# Patient Record
Sex: FEMALE | Race: WHITE | Employment: OTHER | URBAN - METROPOLITAN AREA
[De-identification: names, ages, dates, MRNs, and addresses within clinical notes are randomized per-mention and may not be internally consistent; named-entity substitution may affect disease eponyms.]

---

## 2017-05-30 ENCOUNTER — OFFICE VISIT (OUTPATIENT)
Dept: ORTHOPEDIC SURGERY | Age: 72
End: 2017-05-30

## 2017-05-30 VITALS — BODY MASS INDEX: 24.18 KG/M2 | HEIGHT: 61 IN | WEIGHT: 128.09 LBS

## 2017-05-30 DIAGNOSIS — G56.02 CARPAL TUNNEL SYNDROME OF LEFT WRIST: Primary | ICD-10-CM

## 2017-05-30 PROCEDURE — 1123F ACP DISCUSS/DSCN MKR DOCD: CPT | Performed by: ORTHOPAEDIC SURGERY

## 2017-05-30 PROCEDURE — G8427 DOCREV CUR MEDS BY ELIG CLIN: HCPCS | Performed by: ORTHOPAEDIC SURGERY

## 2017-05-30 PROCEDURE — G8399 PT W/DXA RESULTS DOCUMENT: HCPCS | Performed by: ORTHOPAEDIC SURGERY

## 2017-05-30 PROCEDURE — 1090F PRES/ABSN URINE INCON ASSESS: CPT | Performed by: ORTHOPAEDIC SURGERY

## 2017-05-30 PROCEDURE — 3017F COLORECTAL CA SCREEN DOC REV: CPT | Performed by: ORTHOPAEDIC SURGERY

## 2017-05-30 PROCEDURE — 1036F TOBACCO NON-USER: CPT | Performed by: ORTHOPAEDIC SURGERY

## 2017-05-30 PROCEDURE — 3014F SCREEN MAMMO DOC REV: CPT | Performed by: ORTHOPAEDIC SURGERY

## 2017-05-30 PROCEDURE — 99213 OFFICE O/P EST LOW 20 MIN: CPT | Performed by: ORTHOPAEDIC SURGERY

## 2017-05-30 PROCEDURE — 4040F PNEUMOC VAC/ADMIN/RCVD: CPT | Performed by: ORTHOPAEDIC SURGERY

## 2017-05-30 PROCEDURE — G8420 CALC BMI NORM PARAMETERS: HCPCS | Performed by: ORTHOPAEDIC SURGERY

## 2017-05-31 ENCOUNTER — TELEPHONE (OUTPATIENT)
Dept: ORTHOPEDIC SURGERY | Age: 72
End: 2017-05-31

## 2017-06-08 ENCOUNTER — HOSPITAL ENCOUNTER (OUTPATIENT)
Dept: SURGERY | Age: 72
Discharge: HOME | End: 2017-06-08
Attending: ORTHOPAEDIC SURGERY | Admitting: ORTHOPAEDIC SURGERY

## 2017-06-08 VITALS
BODY MASS INDEX: 24.17 KG/M2 | OXYGEN SATURATION: 99 % | WEIGHT: 128 LBS | TEMPERATURE: 97.4 F | HEART RATE: 63 BPM | RESPIRATION RATE: 14 BRPM | DIASTOLIC BLOOD PRESSURE: 71 MMHG | HEIGHT: 61 IN | SYSTOLIC BLOOD PRESSURE: 163 MMHG

## 2017-06-08 RX ORDER — HYDRALAZINE HYDROCHLORIDE 20 MG/ML
5 INJECTION INTRAMUSCULAR; INTRAVENOUS EVERY 10 MIN PRN
Status: DISCONTINUED | OUTPATIENT
Start: 2017-06-08 | End: 2017-06-09 | Stop reason: HOSPADM

## 2017-06-08 RX ORDER — LIDOCAINE HYDROCHLORIDE 10 MG/ML
0.3 INJECTION, SOLUTION EPIDURAL; INFILTRATION; INTRACAUDAL; PERINEURAL
Status: COMPLETED | OUTPATIENT
Start: 2017-06-08 | End: 2017-06-08

## 2017-06-08 RX ORDER — ONDANSETRON 2 MG/ML
4 INJECTION INTRAMUSCULAR; INTRAVENOUS
Status: ACTIVE | OUTPATIENT
Start: 2017-06-08 | End: 2017-06-08

## 2017-06-08 RX ORDER — LABETALOL HYDROCHLORIDE 5 MG/ML
5 INJECTION, SOLUTION INTRAVENOUS EVERY 10 MIN PRN
Status: DISCONTINUED | OUTPATIENT
Start: 2017-06-08 | End: 2017-06-09 | Stop reason: HOSPADM

## 2017-06-08 RX ORDER — MORPHINE SULFATE 10 MG/ML
2 INJECTION, SOLUTION INTRAMUSCULAR; INTRAVENOUS EVERY 5 MIN PRN
Status: DISCONTINUED | OUTPATIENT
Start: 2017-06-08 | End: 2017-06-09 | Stop reason: HOSPADM

## 2017-06-08 RX ORDER — MORPHINE SULFATE 10 MG/ML
1 INJECTION, SOLUTION INTRAMUSCULAR; INTRAVENOUS EVERY 5 MIN PRN
Status: DISCONTINUED | OUTPATIENT
Start: 2017-06-08 | End: 2017-06-09 | Stop reason: HOSPADM

## 2017-06-08 RX ORDER — HYDROMORPHONE HCL 110MG/55ML
0.5 PATIENT CONTROLLED ANALGESIA SYRINGE INTRAVENOUS EVERY 5 MIN PRN
Status: DISCONTINUED | OUTPATIENT
Start: 2017-06-08 | End: 2017-06-09 | Stop reason: HOSPADM

## 2017-06-08 RX ORDER — OXYCODONE HYDROCHLORIDE AND ACETAMINOPHEN 5; 325 MG/1; MG/1
2 TABLET ORAL PRN
Status: ACTIVE | OUTPATIENT
Start: 2017-06-08 | End: 2017-06-08

## 2017-06-08 RX ORDER — SODIUM CHLORIDE 0.9 % (FLUSH) 0.9 %
10 SYRINGE (ML) INJECTION PRN
Status: DISCONTINUED | OUTPATIENT
Start: 2017-06-08 | End: 2017-06-09 | Stop reason: HOSPADM

## 2017-06-08 RX ORDER — OXYCODONE HYDROCHLORIDE AND ACETAMINOPHEN 5; 325 MG/1; MG/1
1 TABLET ORAL PRN
Status: ACTIVE | OUTPATIENT
Start: 2017-06-08 | End: 2017-06-08

## 2017-06-08 RX ORDER — MEPERIDINE HYDROCHLORIDE 25 MG/ML
12.5 INJECTION INTRAMUSCULAR; INTRAVENOUS; SUBCUTANEOUS EVERY 5 MIN PRN
Status: DISCONTINUED | OUTPATIENT
Start: 2017-06-08 | End: 2017-06-09 | Stop reason: HOSPADM

## 2017-06-08 RX ORDER — HYDROMORPHONE HCL 110MG/55ML
0.25 PATIENT CONTROLLED ANALGESIA SYRINGE INTRAVENOUS EVERY 5 MIN PRN
Status: DISCONTINUED | OUTPATIENT
Start: 2017-06-08 | End: 2017-06-09 | Stop reason: HOSPADM

## 2017-06-08 RX ORDER — HYDROCODONE BITARTRATE AND ACETAMINOPHEN 5; 325 MG/1; MG/1
1 TABLET ORAL EVERY 8 HOURS PRN
Qty: 20 TABLET | Refills: 0 | Status: SHIPPED | OUTPATIENT
Start: 2017-06-08 | End: 2017-06-15

## 2017-06-08 RX ORDER — DIPHENHYDRAMINE HYDROCHLORIDE 50 MG/ML
12.5 INJECTION INTRAMUSCULAR; INTRAVENOUS
Status: ACTIVE | OUTPATIENT
Start: 2017-06-08 | End: 2017-06-08

## 2017-06-08 RX ORDER — PROMETHAZINE HYDROCHLORIDE 25 MG/ML
6.25 INJECTION, SOLUTION INTRAMUSCULAR; INTRAVENOUS
Status: ACTIVE | OUTPATIENT
Start: 2017-06-08 | End: 2017-06-08

## 2017-06-08 RX ORDER — SODIUM CHLORIDE, SODIUM LACTATE, POTASSIUM CHLORIDE, CALCIUM CHLORIDE 600; 310; 30; 20 MG/100ML; MG/100ML; MG/100ML; MG/100ML
INJECTION, SOLUTION INTRAVENOUS CONTINUOUS
Status: DISCONTINUED | OUTPATIENT
Start: 2017-06-08 | End: 2017-06-09 | Stop reason: HOSPADM

## 2017-06-08 RX ORDER — SODIUM CHLORIDE 0.9 % (FLUSH) 0.9 %
10 SYRINGE (ML) INJECTION EVERY 12 HOURS SCHEDULED
Status: DISCONTINUED | OUTPATIENT
Start: 2017-06-08 | End: 2017-06-09 | Stop reason: HOSPADM

## 2017-06-08 RX ORDER — SULFAMETHOXAZOLE AND TRIMETHOPRIM 800; 160 MG/1; MG/1
1 TABLET ORAL 2 TIMES DAILY
Qty: 20 TABLET | Refills: 0 | Status: SHIPPED | OUTPATIENT
Start: 2017-06-08 | End: 2017-06-18

## 2017-06-08 RX ADMIN — SODIUM CHLORIDE, SODIUM LACTATE, POTASSIUM CHLORIDE, CALCIUM CHLORIDE 125 ML/HR: 600; 310; 30; 20 INJECTION, SOLUTION INTRAVENOUS at 10:43

## 2017-06-08 RX ADMIN — LIDOCAINE HYDROCHLORIDE 0.1 ML: 10 INJECTION, SOLUTION EPIDURAL; INFILTRATION; INTRACAUDAL; PERINEURAL at 10:44

## 2017-06-08 ASSESSMENT — PAIN SCALES - GENERAL
PAINLEVEL_OUTOF10: 0

## 2017-06-08 ASSESSMENT — PAIN - FUNCTIONAL ASSESSMENT: PAIN_FUNCTIONAL_ASSESSMENT: 0-10

## 2017-06-08 ASSESSMENT — PAIN DESCRIPTION - DESCRIPTORS: DESCRIPTORS: ACHING;NUMBNESS

## 2017-06-19 ENCOUNTER — OFFICE VISIT (OUTPATIENT)
Dept: ORTHOPEDIC SURGERY | Age: 72
End: 2017-06-19

## 2017-06-19 VITALS — BODY MASS INDEX: 24.18 KG/M2 | HEIGHT: 61 IN | WEIGHT: 128.09 LBS

## 2017-06-19 DIAGNOSIS — G56.02 CARPAL TUNNEL SYNDROME OF LEFT WRIST: Primary | ICD-10-CM

## 2017-06-19 PROCEDURE — 99024 POSTOP FOLLOW-UP VISIT: CPT | Performed by: ORTHOPAEDIC SURGERY

## 2017-06-19 PROCEDURE — L3908 WHO COCK-UP NONMOLDE PRE OTS: HCPCS | Performed by: ORTHOPAEDIC SURGERY

## 2017-10-16 ENCOUNTER — OFFICE VISIT (OUTPATIENT)
Dept: ORTHOPEDIC SURGERY | Age: 72
End: 2017-10-16

## 2017-10-16 VITALS
SYSTOLIC BLOOD PRESSURE: 134 MMHG | HEIGHT: 61 IN | DIASTOLIC BLOOD PRESSURE: 74 MMHG | BODY MASS INDEX: 24.18 KG/M2 | WEIGHT: 128.09 LBS | HEART RATE: 73 BPM

## 2017-10-16 DIAGNOSIS — M17.11 PRIMARY LOCALIZED OSTEOARTHROSIS OF RIGHT LOWER LEG: ICD-10-CM

## 2017-10-16 DIAGNOSIS — M25.561 CHRONIC PAIN OF RIGHT KNEE: Primary | ICD-10-CM

## 2017-10-16 DIAGNOSIS — G89.29 CHRONIC PAIN OF RIGHT KNEE: Primary | ICD-10-CM

## 2017-10-16 PROCEDURE — G8427 DOCREV CUR MEDS BY ELIG CLIN: HCPCS | Performed by: ORTHOPAEDIC SURGERY

## 2017-10-16 PROCEDURE — 3017F COLORECTAL CA SCREEN DOC REV: CPT | Performed by: ORTHOPAEDIC SURGERY

## 2017-10-16 PROCEDURE — G8399 PT W/DXA RESULTS DOCUMENT: HCPCS | Performed by: ORTHOPAEDIC SURGERY

## 2017-10-16 PROCEDURE — 1123F ACP DISCUSS/DSCN MKR DOCD: CPT | Performed by: ORTHOPAEDIC SURGERY

## 2017-10-16 PROCEDURE — 1090F PRES/ABSN URINE INCON ASSESS: CPT | Performed by: ORTHOPAEDIC SURGERY

## 2017-10-16 PROCEDURE — 20610 DRAIN/INJ JOINT/BURSA W/O US: CPT | Performed by: ORTHOPAEDIC SURGERY

## 2017-10-16 PROCEDURE — G8420 CALC BMI NORM PARAMETERS: HCPCS | Performed by: ORTHOPAEDIC SURGERY

## 2017-10-16 PROCEDURE — G8484 FLU IMMUNIZE NO ADMIN: HCPCS | Performed by: ORTHOPAEDIC SURGERY

## 2017-10-16 PROCEDURE — 1036F TOBACCO NON-USER: CPT | Performed by: ORTHOPAEDIC SURGERY

## 2017-10-16 PROCEDURE — 99213 OFFICE O/P EST LOW 20 MIN: CPT | Performed by: ORTHOPAEDIC SURGERY

## 2017-10-16 PROCEDURE — 3014F SCREEN MAMMO DOC REV: CPT | Performed by: ORTHOPAEDIC SURGERY

## 2017-10-16 PROCEDURE — 73562 X-RAY EXAM OF KNEE 3: CPT | Performed by: ORTHOPAEDIC SURGERY

## 2017-10-16 PROCEDURE — 4040F PNEUMOC VAC/ADMIN/RCVD: CPT | Performed by: ORTHOPAEDIC SURGERY

## 2017-10-16 NOTE — PATIENT INSTRUCTIONS
Patient Education        Knee Arthritis: Care Instructions  Your Care Instructions  Knee arthritis is a breakdown of the cartilage that cushions your knee joint. When the cartilage wears down, your bones rub against each other. This causes pain and stiffness. Knee arthritis tends to get worse with time. Treatment for knee arthritis involves reducing pain, making the leg muscles stronger, and staying at a healthy body weight. The treatment usually does not improve the health of the cartilage, but it can reduce pain and improve how well your knee works. You can take simple measures to protect your knee joints, ease your pain, and help you stay active. Follow-up care is a key part of your treatment and safety. Be sure to make and go to all appointments, and call your doctor if you are having problems. It's also a good idea to know your test results and keep a list of the medicines you take. How can you care for yourself at home? · Know that knee arthritis will cause more pain on some days than on others. · Stay at a healthy weight. Lose weight if you are overweight. When you stand up, the pressure on your knees from every pound of body weight is multiplied four times. So if you lose 10 pounds, you will reduce the pressure on your knees by 40 pounds. · Talk to your doctor or physical therapist about exercises that will help ease joint pain. ¨ Stretch to help prevent stiffness and to prevent injury before you exercise. You may enjoy gentle forms of yoga to help keep your knee joints and muscles flexible. ¨ Walk instead of jog. ¨ Ride a bike. This makes your thigh muscles stronger and takes pressure off your knee. ¨ Wear well-fitting and comfortable shoes. ¨ Exercise in chest-deep water. This can help you exercise longer with less pain. ¨ Avoid exercises that include squatting or kneeling. They can put a lot of strain on your knees.   ¨ Talk to your doctor to make sure that the exercise you do is not making seek immediate medical care if:  · You have sudden swelling, warmth, or pain in your knee. · You have knee pain and a fever or rash. · You have such bad pain that you cannot use your knee. Watch closely for changes in your health, and be sure to contact your doctor if you have any problems. Where can you learn more? Go to https://chpepicewmichelet.Neptune.io. org and sign in to your SecondHome account. Enter Q414 in the ReliantHeart box to learn more about \"Knee Arthritis: Care Instructions. \"     If you do not have an account, please click on the \"Sign Up Now\" link. Current as of: October 31, 2016  Content Version: 11.3  © 7960-6908 [employer]. Care instructions adapted under license by [facility] ([facility]). If you have questions about a medical condition or this instruction, always ask your healthcare professional. [place] [address] any warranty or liability for your use of this information. Patient Education        Joint Injections: Care Instructions  Your Care Instructions  Joint injections are shots into a joint, such as the knee. They may be used to put in medicines, such as pain relievers. Or they can be used to take out fluid. Sometimes the fluid is tested in a lab. This can help find the cause of a joint problem. A corticosteroid, or steroid, shot is used to reduce inflammation in tendons or joints. It is often used to treat problems such as arthritis, tendinitis, and bursitis. Steroids can be injected directly into a painful, inflamed joint. They can also help reduce inflammation of a bursa. A bursa is a sac of fluid. It cushions and lubricates areas where tendons, ligaments, skin, muscles, or bones rub against each other. A steroid shot can sometimes help with short-term pain relief when other treatments haven't worked. If steroid shots help, pain may improve for weeks or months. Follow-up care is a key part of your treatment and safety.  Be sure to make and go to all appointments, and call your doctor if you are having problems. It's also a good idea to know your test results and keep a list of the medicines you take. How can you care for yourself at home? · Put ice or a cold pack on the area for 10 to 20 minutes at a time. Put a thin cloth between the ice and your skin. · Take anti-inflammatory medicines to reduce pain, swelling, or inflammation. These include ibuprofen (Advil, Motrin) and naproxen (Aleve). Read and follow all instructions on the label. · Avoid strenuous activities for several days, especially those that put stress on the area where you got the shot. · If you have dressings over the area, keep them clean and dry. You may remove them when your doctor tells you to. When should you call for help? Call your doctor now or seek immediate medical care if:  · You have signs of infection, such as:  ¨ Increased pain, swelling, warmth, or redness. ¨ Red streaks leading from the site. ¨ Pus draining from the site. ¨ A fever. Watch closely for changes in your health, and be sure to contact your doctor if you have any problems. Where can you learn more? Go to https://PAS-Analytik.Pickup Services. org and sign in to your InterValve account. Enter N616 in the An[employer] box to learn more about \"Joint Injections: Care Instructions. \"     If you do not have an account, please click on the \"Sign Up Now\" link. Current as of: March 21, 2017  Content Version: 11.3  © 6530-6781 [employer]om. Care instructions adapted under license by [facility] ([facility]). If you have questions about a medical condition or this instruction, always ask your healthcare professional. [address] any warranty or liability for your use of this information.

## 2017-10-16 NOTE — PROGRESS NOTES
KNEE VISIT      HISTORY OF PRESENT ILLNESS    Stevo Aguero is a 71 y.o. female who presents for right knee pain. Patient's had pain in her knee for many years approximately 1 year ago she began meloxicam and reported dramatic improvement in her discomfort. However over this past year her symptoms have increased and occasionally she reports there at 10/10. This generally happens when she is bending her knee or when she arises from a seated position. Occasionally she'll have a single sharp pain that limits her ability to bend and then this resolves. She will occasionally experience locking of the knee which is associated with a sensation of giving way. She is here for evaluation and treatment recommendations. ROS    10 point review of systems is positive for memory loss, ankle swelling, easy bruising or bruising ability, and joint pain. All other ROS negative except for above. Past Surgical history    Past Surgical History:   Procedure Laterality Date    BACK SURGERY      CARPAL TUNNEL RELEASE      CHOLECYSTECTOMY      COLONOSCOPY  3/25/13    normal       PAST MEDICAL    Past Medical History:   Diagnosis Date    Anxiety     Arthritis     Back pain     Cancer ([facility] [address]     skin    Depression        Allergies    Allergies   Allergen Reactions    Indocin [Indomethacin] Other (See Comments)     This allergy is listed on her history and physical form. Patient unaware of this medication. Meds    Current Outpatient Prescriptions   Medication Sig Dispense Refill    MELOXICAM PO Take by mouth      vitamin B-1 (THIAMINE) 100 MG tablet Take 100 mg by mouth daily      Ascorbic Acid (VITAMIN C) 250 MG tablet Take 250 mg by mouth daily      Multiple Vitamin (MULTI-VITAMIN DAILY PO) Take  by mouth.  fluoxetine (PROZAC) 40 MG capsule Take 40 mg by mouth daily.  aspirin 81 MG chewable tablet Take 81 mg by mouth daily.         tramadol (ULTRAM) 50 MG tablet Take 50 mg by mouth every 6 hours seen at the medial joint line which may or may not be intra-articular and could potentially account for episodes of locking. IMPRESSION    Osteoarthritis of the right knee primarily in the patellofemoral joint with possible loose body formation    PLAN      1. Conservative care options including physical therapy, NSAIDs, bracing, injections and activity modification were discussed. 2.  The indications for therapeutic injections were discussed. 3.  The indications for additional imaging studies were discussed. 4.  After considering the various options discussed, the patient elected to pursue a course that includes viscous supplementation of the right knee to treat her osteoarthritis. The risks, benefits, and complications of the euflexxa injections were  discussed in detail with the patient. The risks discussed included but are not limited to infection, skin reactions, hot swollen joints, and anaphylaxis. The patient gave verbal informed consent for the injection. The patient skin was prepped with iodine and sterile 4x4 gauze pad and the right knee joint was injected with 2 ml of Euflexxa intra-articularly under sterile conditions  into the supra-lateral pouch. The patient tolerated the injection reasonably well. The patient was given instructions to ice the right knee and avoid strenuous activities for 24-48 hours. The patient was instructed to call the office immediately if there is increased pain, redness, warmth, fever, or chills. We will see the patient back in one week for their second injection.

## 2017-10-23 ENCOUNTER — OFFICE VISIT (OUTPATIENT)
Dept: ORTHOPEDIC SURGERY | Age: 72
End: 2017-10-23

## 2017-10-23 VITALS
BODY MASS INDEX: 24.18 KG/M2 | DIASTOLIC BLOOD PRESSURE: 67 MMHG | HEART RATE: 73 BPM | SYSTOLIC BLOOD PRESSURE: 148 MMHG | HEIGHT: 61 IN | WEIGHT: 128.09 LBS

## 2017-10-23 DIAGNOSIS — M17.11 PRIMARY LOCALIZED OSTEOARTHROSIS OF RIGHT LOWER LEG: Primary | ICD-10-CM

## 2017-10-23 PROCEDURE — 20610 DRAIN/INJ JOINT/BURSA W/O US: CPT | Performed by: ORTHOPAEDIC SURGERY

## 2017-10-23 NOTE — PROGRESS NOTES
The patient returns today for their second Euflexxa injection to the right knee for diagnosis of osteoarthritis. . The risks, benefits, and complications of the injections were again discussed in detail with the patient. The risks discussed included but are not limited to infection, skin reactions, hot swollen joints, and anaphylaxis. The patient gave verbal informed consent for the injection. The patient skin was prepped with iodine and sterile 4x4 gauze pad and the right knee joint was injected with 2 ml of Euflexxa intra-articularly under sterile conditions  into the supra-lateral pouch. The patient tolerated the injection reasonably well. The patient was given instructions to ice the right knee and avoid strenuous activities for 24-48 hours. The patient was instructed to call the office immediately if there is increased pain, redness, warmth, fever, or chills. We will see the patient back in [one week] for their third injection.

## 2017-11-06 ENCOUNTER — OFFICE VISIT (OUTPATIENT)
Dept: ORTHOPEDIC SURGERY | Age: 72
End: 2017-11-06

## 2017-11-06 VITALS
HEART RATE: 76 BPM | DIASTOLIC BLOOD PRESSURE: 67 MMHG | HEIGHT: 61 IN | SYSTOLIC BLOOD PRESSURE: 135 MMHG | BODY MASS INDEX: 24.18 KG/M2 | WEIGHT: 128.09 LBS

## 2017-11-06 DIAGNOSIS — M17.11 PRIMARY LOCALIZED OSTEOARTHROSIS OF RIGHT LOWER LEG: Primary | ICD-10-CM

## 2017-11-06 PROCEDURE — 20610 DRAIN/INJ JOINT/BURSA W/O US: CPT | Performed by: ORTHOPAEDIC SURGERY

## 2017-11-06 NOTE — PATIENT INSTRUCTIONS
the arthritis worse. · Do not sit for long periods of time. Try to walk once in a while to keep your knee from getting stiff. · Ask your doctor or physical therapist whether shoe inserts may reduce your arthritis pain. · If you can afford it, get new athletic shoes at least every year. This can help reduce the strain on your knees. · Use a device to help you do everyday activities. ¨ A cane or walking stick can help you keep your balance when you walk. Hold the cane or walking stick in the hand opposite the painful knee. ¨ If you feel like you may fall when you walk, try using crutches or a front-wheeled walker. These can prevent falls that could cause more damage to your knee. ¨ A knee brace may help keep your knee stable and prevent pain. ¨ You also can use other things to make life easier, such as a higher toilet seat and handrails in the bathtub or shower. · Take pain medicines exactly as directed. ¨ Do not wait until you are in severe pain. You will get better results if you take it sooner. ¨ If you are not taking a prescription pain medicine, take an over-the-counter medicine such as acetaminophen (Tylenol), ibuprofen (Advil, Motrin), or naproxen (Aleve). Read and follow all instructions on the label. ¨ Do not take two or more pain medicines at the same time unless the doctor told you to. Many pain medicines have acetaminophen, which is Tylenol. Too much acetaminophen (Tylenol) can be harmful. ¨ Tell your doctor if you take a blood thinner, have diabetes, or have allergies to shellfish. · Ask your doctor if you might benefit from a shot of steroid medicine into your knee. This may provide pain relief for several months. · Many people take the supplements glucosamine and chondroitin for osteoarthritis. Some people feel they help, but the medical research does not show that they work. Talk to your doctor before you take these supplements. When should you call for help?   Call your doctor now or seek immediate medical care if:  · You have sudden swelling, warmth, or pain in your knee. · You have knee pain and a fever or rash. · You have such bad pain that you cannot use your knee. Watch closely for changes in your health, and be sure to contact your doctor if you have any problems. Where can you learn more? Go to https://chpepicewmichelet.Wi3. org and sign in to your [employer] account. Enter J073 in the Medivantix Technologies box to learn more about \"Knee Arthritis: Care Instructions. \"     If you do not have an account, please click on the \"Sign Up Now\" link. Current as of: October 31, 2016  Content Version: 11.3  © 3614-9223 [employer]. Care instructions adapted under license by [facility] ([facility]). If you have questions about a medical condition or this instruction, always ask your healthcare professional. [place] [address] any warranty or liability for your use of this information. Patient Education        Joint Injections: Care Instructions  Your Care Instructions  Joint injections are shots into a joint, such as the knee. They may be used to put in medicines, such as pain relievers. Or they can be used to take out fluid. Sometimes the fluid is tested in a lab. This can help find the cause of a joint problem. A corticosteroid, or steroid, shot is used to reduce inflammation in tendons or joints. It is often used to treat problems such as arthritis, tendinitis, and bursitis. Steroids can be injected directly into a painful, inflamed joint. They can also help reduce inflammation of a bursa. A bursa is a sac of fluid. It cushions and lubricates areas where tendons, ligaments, skin, muscles, or bones rub against each other. A steroid shot can sometimes help with short-term pain relief when other treatments haven't worked. If steroid shots help, pain may improve for weeks or months. Follow-up care is a key part of your treatment and safety.  Be sure to make and go to all appointments, and call your doctor if you are having problems. It's also a good idea to know your test results and keep a list of the medicines you take. How can you care for yourself at home? · Put ice or a cold pack on the area for 10 to 20 minutes at a time. Put a thin cloth between the ice and your skin. · Take anti-inflammatory medicines to reduce pain, swelling, or inflammation. These include ibuprofen (Advil, Motrin) and naproxen (Aleve). Read and follow all instructions on the label. · Avoid strenuous activities for several days, especially those that put stress on the area where you got the shot. · If you have dressings over the area, keep them clean and dry. You may remove them when your doctor tells you to. When should you call for help? Call your doctor now or seek immediate medical care if:  · You have signs of infection, such as:  ¨ Increased pain, swelling, warmth, or redness. ¨ Red streaks leading from the site. ¨ Pus draining from the site. ¨ A fever. Watch closely for changes in your health, and be sure to contact your doctor if you have any problems. Where can you learn more? Go to https://Smart Picture Technologies.Rumble. org and sign in to your COM DEV account. Enter N616 in the "[employer] box to learn more about \"Joint Injections: Care Instructions. \"     If you do not have an account, please click on the \"Sign Up Now\" link. Current as of: March 21, 2017  Content Version: 11.3  © 2207-0722 HBCS, JDCPhosphate. Care instructions adapted under license by [facility] ([facility]). If you have questions about a medical condition or this instruction, always ask your healthcare professional. [address] any warranty or liability for your use of this information.

## 2017-11-06 NOTE — PROGRESS NOTES
The patient returns today for their third Euflexxa injection to the right knee for diagnosis of osteoarthritis. The risks, benefits, and complications of the injections were again discussed in detail with the patient. The risks discussed included but are not limited to infection, skin reactions, hot swollen joints, and anaphylaxis. The patient gave verbal informed consent for the injection. The patient skin was prepped with iodine and sterile 4x4 gauze pad and the right knee joint was injected with 2 ml of Euflexxa intra-articularly under sterile conditions  into the supra-lateral pouch. The patient tolerated the injection reasonably well. The patient was given instructions to ice the right knee and avoid strenuous activities for 24-48 hours. The patient was instructed to call the office immediately if there is increased pain, redness, warmth, fever, or chills. We will see the patient back in 3-months for a follow-up.

## 2017-12-13 ENCOUNTER — HOSPITAL ENCOUNTER (OUTPATIENT)
Dept: MAMMOGRAPHY | Age: 72
Discharge: HOME | End: 2017-12-13
Attending: LICENSED PRACTICAL NURSE | Admitting: LICENSED PRACTICAL NURSE

## 2017-12-13 DIAGNOSIS — N63.20 LEFT BREAST LUMP: ICD-10-CM

## 2017-12-13 DIAGNOSIS — N63.0 BREAST LUMP: ICD-10-CM

## 2018-02-09 ENCOUNTER — OFFICE VISIT (OUTPATIENT)
Dept: ORTHOPEDIC SURGERY | Age: 73
End: 2018-02-09

## 2018-02-09 VITALS
DIASTOLIC BLOOD PRESSURE: 69 MMHG | BODY MASS INDEX: 25.1 KG/M2 | WEIGHT: 132.94 LBS | HEART RATE: 82 BPM | HEIGHT: 61 IN | SYSTOLIC BLOOD PRESSURE: 128 MMHG

## 2018-02-09 DIAGNOSIS — M17.11 PRIMARY LOCALIZED OSTEOARTHROSIS OF RIGHT LOWER LEG: Primary | ICD-10-CM

## 2018-02-09 PROCEDURE — G8399 PT W/DXA RESULTS DOCUMENT: HCPCS | Performed by: ORTHOPAEDIC SURGERY

## 2018-02-09 PROCEDURE — G8419 CALC BMI OUT NRM PARAM NOF/U: HCPCS | Performed by: ORTHOPAEDIC SURGERY

## 2018-02-09 PROCEDURE — 3014F SCREEN MAMMO DOC REV: CPT | Performed by: ORTHOPAEDIC SURGERY

## 2018-02-09 PROCEDURE — G8427 DOCREV CUR MEDS BY ELIG CLIN: HCPCS | Performed by: ORTHOPAEDIC SURGERY

## 2018-02-09 PROCEDURE — 1123F ACP DISCUSS/DSCN MKR DOCD: CPT | Performed by: ORTHOPAEDIC SURGERY

## 2018-02-09 PROCEDURE — 3017F COLORECTAL CA SCREEN DOC REV: CPT | Performed by: ORTHOPAEDIC SURGERY

## 2018-02-09 PROCEDURE — 4040F PNEUMOC VAC/ADMIN/RCVD: CPT | Performed by: ORTHOPAEDIC SURGERY

## 2018-02-09 PROCEDURE — G8484 FLU IMMUNIZE NO ADMIN: HCPCS | Performed by: ORTHOPAEDIC SURGERY

## 2018-02-09 PROCEDURE — 1090F PRES/ABSN URINE INCON ASSESS: CPT | Performed by: ORTHOPAEDIC SURGERY

## 2018-02-09 PROCEDURE — 1036F TOBACCO NON-USER: CPT | Performed by: ORTHOPAEDIC SURGERY

## 2018-02-09 PROCEDURE — 99212 OFFICE O/P EST SF 10 MIN: CPT | Performed by: ORTHOPAEDIC SURGERY

## 2018-02-09 NOTE — PROGRESS NOTES
History  Maulik Gordon is a 71 y.o. female who returns for follow-up of a bilateral knee osteoarthritis. Symptoms are has significantly worsened in the right knee. Pain level today is 8/10. Treatment to date includes oral anti-inflammatories and Visco supplementation. Patient's medications, allergies, past medical, surgical, social and family histories were reviewed and updated as appropriate. Review of Systems  Relevant review of systems reviewed and available in the patient's chart    Primary Area of CC: Patient reports her symptoms in the right knee have increased to a level that she is unable to engage in her normal activities of daily living and would like to proceed with operative intervention. Examination proximal and distal to the injured area show good overall ROM, no bony prominence or soft tissue tenderness, no instability or excessive stiffness. Radiology:       Impression  1. Primary localized osteoarthrosis of right lower leg  MRI Knee Right W JT WO Contrast            Plan  We discussed the risk, benefits, and potential complications of total knee replacement arthroplasty surgery. The patient voiced their understanding to concerns that include infection, deep vein thrombosis, neurological injury, and delayed rehabilitation. The patient also realizes that there are concerns regarding the potential need for manipulation under anesthesia if range of motion proves to be problematic. The patient also understands that there is always a chance of dystrophy and anesthetic complications that would include a stroke, cardiopulmonary pathology, and even death. We also discussed the rehabilitation involved with this operation and options that involved not only the hospitalization but then the potential of either going home with visiting home therapy versus going to a rehabilitation facility.  We talked about the nuances of each of these treatments as well as utilization of a continuous passive motion machine (CPM machine). The patient also realizes the need for the hinged knee brace in the rehabilitation process as well as the very significant role that the patient plays in terms of rehabilitation after this type of operation. All questions were answered.     Doyle Lombard

## 2018-02-13 ENCOUNTER — HOSPITAL ENCOUNTER (OUTPATIENT)
Dept: MRI IMAGING | Age: 73
Discharge: HOME | End: 2018-02-13
Attending: ORTHOPAEDIC SURGERY | Admitting: ORTHOPAEDIC SURGERY

## 2018-02-13 DIAGNOSIS — M17.11 PRIMARY LOCALIZED OSTEOARTHROSIS OF RIGHT LOWER LEG: ICD-10-CM

## 2018-02-13 DIAGNOSIS — M17.11 PRIMARY OSTEOARTHRITIS OF RIGHT KNEE: ICD-10-CM

## 2018-02-21 ASSESSMENT — KOOS JR
BENDING TO THE FLOOR TO PICK UP OBJECT: 1
STANDING UPRIGHT: 1
RISING FROM SITTING: 1
TWISING OR PIVOTING ON KNEE: 3
GOING UP OR DOWN STAIRS: 2
HOW SEVERE IS YOUR KNEE STIFFNESS AFTER FIRST WAKING IN MORNING: 4
STRAIGHTENING KNEE FULLY: 2

## 2018-02-21 ASSESSMENT — PROMIS GLOBAL HEALTH SCALE
TO WHAT EXTENT ARE YOU ABLE TO CARRY OUT YOUR EVERYDAY PHYSICAL ACTIVITIES SUCH AS WALKING, CLIMBING STAIRS, CARRYING GROCERIES, OR MOVING A CHAIR [ON A SCALE OF 1 (NOT AT ALL) TO 5 (COMPLETELY)]?: 3
WHO IS THE PERSON COMPLETING THE PROMIS V1.1 SURVEY?: 0
SUM OF RESPONSES TO QUESTIONS 3, 6, 7, & 8: 16
HOW IS THE PROMIS V1.1 BEING ADMINISTERED?: 0
IN GENERAL, WOULD YOU SAY YOUR QUALITY OF LIFE IS...[ON A SCALE OF 1 (POOR) TO 5 (EXCELLENT)]: 3
IN GENERAL, HOW WOULD YOU RATE YOUR MENTAL HEALTH, INCLUDING YOUR MOOD AND YOUR ABILITY TO THINK [ON A SCALE OF 1 (POOR) TO 5 (EXCELLENT)]?: 2
IN GENERAL, WOULD YOU SAY YOUR HEALTH IS...[ON A SCALE OF 1 (POOR) TO 5 (EXCELLENT)]: 3
IN THE PAST 7 DAYS, HOW WOULD YOU RATE YOUR FATIGUE ON AVERAGE [ON A SCALE FROM 1 (NONE) TO 5 (VERY SEVERE)]?: 2
IN THE PAST 7 DAYS, HOW OFTEN HAVE YOU BEEN BOTHERED BY EMOTIONAL PROBLEMS, SUCH AS FEELING ANXIOUS, DEPRESSED, OR IRRITABLE [ON A SCALE FROM 1 (NEVER) TO 5 (ALWAYS)]?: 3
SUM OF RESPONSES TO QUESTIONS 2, 4, 5, & 10: 13
IN GENERAL, HOW WOULD YOU RATE YOUR PHYSICAL HEALTH [ON A SCALE OF 1 (POOR) TO 5 (EXCELLENT)]?: 3
IN GENERAL, PLEASE RATE HOW WELL YOU CARRY OUT YOUR USUAL SOCIAL ACTIVITIES (INCLUDES ACTIVITIES AT HOME, AT WORK, AND IN YOUR COMMUNITY, AND RESPONSIBILITIES AS A PARENT, CHILD, SPOUSE, EMPLOYEE, FRIEND, ETC) [ON A SCALE OF 1 (POOR) TO 5 (EXCELLENT)]?: 4
IN THE PAST 7 DAYS, HOW WOULD YOU RATE YOUR PAIN ON AVERAGE [ON A SCALE FROM 0 (NO PAIN) TO 10 (WORST IMAGINABLE PAIN)]?: 8
IN GENERAL, HOW WOULD YOU RATE YOUR SATISFACTION WITH YOUR SOCIAL ACTIVITIES AND RELATIONSHIPS [ON A SCALE OF 1 (POOR) TO 5 (EXCELLENT)]?: 5

## 2018-02-28 ENCOUNTER — HOSPITAL ENCOUNTER (OUTPATIENT)
Dept: OTHER | Age: 73
Discharge: HOME | End: 2018-02-28
Attending: ORTHOPAEDIC SURGERY | Admitting: ORTHOPAEDIC SURGERY

## 2018-02-28 ENCOUNTER — TELEPHONE (OUTPATIENT)
Dept: ORTHOPEDIC SURGERY | Age: 73
End: 2018-02-28

## 2018-03-01 ENCOUNTER — TELEPHONE (OUTPATIENT)
Dept: ORTHOPEDIC SURGERY | Age: 73
End: 2018-03-01

## 2018-03-01 LAB
EKG ATRIAL RATE: 63 BPM
EKG DIAGNOSIS: NORMAL
EKG P AXIS: 35 DEGREES
EKG P-R INTERVAL: 186 MS
EKG Q-T INTERVAL: 396 MS
EKG QRS DURATION: 78 MS
EKG QTC CALCULATION (BAZETT): 405 MS
EKG R AXIS: -11 DEGREES
EKG T AXIS: 51 DEGREES
EKG VENTRICULAR RATE: 63 BPM

## 2018-03-01 PROCEDURE — 93010 ELECTROCARDIOGRAM REPORT: CPT | Performed by: INTERNAL MEDICINE

## 2018-03-06 ENCOUNTER — TELEPHONE (OUTPATIENT)
Dept: ORTHOPEDIC SURGERY | Age: 73
End: 2018-03-06

## 2018-03-07 ENCOUNTER — TELEPHONE (OUTPATIENT)
Dept: ORTHOPEDIC SURGERY | Age: 73
End: 2018-03-07

## 2018-03-13 ENCOUNTER — TELEPHONE (OUTPATIENT)
Dept: ORTHOPEDIC SURGERY | Age: 73
End: 2018-03-13

## 2018-03-13 NOTE — TELEPHONE ENCOUNTER
Attempted to contact patient. Left voicemail for patient stating purpose and call back number.    Boo Dutton  Orthopedic Nurse Navigator  Phone number: [phone]

## 2018-03-14 ENCOUNTER — TELEPHONE (OUTPATIENT)
Dept: CASE MANAGEMENT | Age: 73
End: 2018-03-14

## 2018-03-21 PROBLEM — M17.11 PRIMARY OSTEOARTHRITIS OF RIGHT KNEE: Status: ACTIVE | Noted: 2018-03-21

## 2018-03-22 DIAGNOSIS — M17.11 PRIMARY LOCALIZED OSTEOARTHROSIS OF RIGHT LOWER LEG: Primary | ICD-10-CM

## 2018-03-24 ENCOUNTER — CARE COORDINATION (OUTPATIENT)
Dept: CASE MANAGEMENT | Age: 73
End: 2018-03-24

## 2018-03-25 NOTE — CARE COORDINATION
James Plummer  : 1945    Right Total Knee Arthroplasty    CTC attempted first  [facility] 24 hour follow up phone call, left message and Contact information for  patient and encouraged her to return call.      Home Care Agency active: [facility] home care for SN, PT/OT,       Follow up appointments:    Future Appointments  Date Time Provider Ricarda Calloway   2018 10:00 AM Sherie Ely MD [facility]     Leeann Martinez MSN, MA, RN  Care Transition Coordinator  [phone]

## 2018-03-26 NOTE — CARE COORDINATION
[facility] Ortho Bundle Outreach:     Unable to reach patient on mobile or home numbers. Messages left on both stating purpose of call and requesting return call back today. Call to [facility], spoke with Hesham Ruano. States they have also been unable to reach patient by phone but PT was able to do [facility] on Saturday by doing a \"drive by\". Nursing has been unable to start services. They are scheduled for a drive by today. Hesham Ruano has left multiple messages stating patient needs to call their office and allow SN to visit today or PT cannot continue to go out as this is a SN SOC. Hesham Ruano will call writer with update on progress reaching patient today. Review of chart media, her HIPAA form dated 10/16/2017, daughter Adiel Gallardo, is able to receive health information. Call to daughter's mobile number in chart ([phone]). Left message on voice mail stating purpose of call requesting return call to writer and to [facility] with both numbers provided.     Soumya Middleton RN  Care Transition Coordinator  [phone] cell    Follow up appointments:    Future Appointments  Date Time Provider Ricarda Calloway   4/6/2018 10:00 AM Wendi Adame MD [facility]

## 2018-03-28 ENCOUNTER — CARE COORDINATION (OUTPATIENT)
Dept: CASE MANAGEMENT | Age: 73
End: 2018-03-28

## 2018-04-04 ENCOUNTER — CARE COORDINATION (OUTPATIENT)
Dept: CASE MANAGEMENT | Age: 73
End: 2018-04-04

## 2018-04-06 ENCOUNTER — OFFICE VISIT (OUTPATIENT)
Dept: ORTHOPEDIC SURGERY | Age: 73
End: 2018-04-06

## 2018-04-06 VITALS
BODY MASS INDEX: 25.4 KG/M2 | DIASTOLIC BLOOD PRESSURE: 78 MMHG | SYSTOLIC BLOOD PRESSURE: 124 MMHG | HEIGHT: 62 IN | WEIGHT: 138.01 LBS | HEART RATE: 78 BPM

## 2018-04-06 DIAGNOSIS — M17.11 PRIMARY OSTEOARTHRITIS OF RIGHT KNEE: Primary | ICD-10-CM

## 2018-04-06 PROCEDURE — 99024 POSTOP FOLLOW-UP VISIT: CPT | Performed by: ORTHOPAEDIC SURGERY

## 2018-04-11 ENCOUNTER — CARE COORDINATION (OUTPATIENT)
Dept: CASE MANAGEMENT | Age: 73
End: 2018-04-11

## 2018-04-17 ENCOUNTER — HOSPITAL ENCOUNTER (OUTPATIENT)
Dept: PHYSICAL THERAPY | Age: 73
Discharge: HOME | End: 2018-04-30
Attending: ORTHOPAEDIC SURGERY | Admitting: ORTHOPAEDIC SURGERY

## 2018-04-18 ENCOUNTER — CARE COORDINATION (OUTPATIENT)
Dept: CASE MANAGEMENT | Age: 73
End: 2018-04-18

## 2018-04-26 ENCOUNTER — CARE COORDINATION (OUTPATIENT)
Dept: CASE MANAGEMENT | Age: 73
End: 2018-04-26

## 2018-05-01 ENCOUNTER — CARE COORDINATION (OUTPATIENT)
Dept: CASE MANAGEMENT | Age: 73
End: 2018-05-01

## 2018-05-01 ENCOUNTER — HOSPITAL ENCOUNTER (OUTPATIENT)
Dept: OTHER | Age: 73
Discharge: HOME | End: 2018-05-09
Attending: ORTHOPAEDIC SURGERY | Admitting: ORTHOPAEDIC SURGERY

## 2018-05-02 ENCOUNTER — HOSPITAL ENCOUNTER (OUTPATIENT)
Dept: PHYSICAL THERAPY | Age: 73
Discharge: HOME | End: 2018-05-03
Admitting: ORTHOPAEDIC SURGERY

## 2018-05-08 ENCOUNTER — CARE COORDINATION (OUTPATIENT)
Dept: CASE MANAGEMENT | Age: 73
End: 2018-05-08

## 2018-05-09 ENCOUNTER — HOSPITAL ENCOUNTER (OUTPATIENT)
Dept: PHYSICAL THERAPY | Age: 73
Discharge: HOME | End: 2018-05-10
Admitting: ORTHOPAEDIC SURGERY

## 2018-05-15 ENCOUNTER — CARE COORDINATION (OUTPATIENT)
Dept: CASE MANAGEMENT | Age: 73
End: 2018-05-15

## 2018-05-23 ENCOUNTER — CARE COORDINATION (OUTPATIENT)
Dept: CASE MANAGEMENT | Age: 73
End: 2018-05-23

## 2018-06-05 ENCOUNTER — CARE COORDINATION (OUTPATIENT)
Dept: CASE MANAGEMENT | Age: 73
End: 2018-06-05

## 2018-06-11 ENCOUNTER — OFFICE VISIT (OUTPATIENT)
Dept: ORTHOPEDIC SURGERY | Age: 73
End: 2018-06-11

## 2018-06-11 VITALS
BODY MASS INDEX: 25.4 KG/M2 | SYSTOLIC BLOOD PRESSURE: 132 MMHG | WEIGHT: 138 LBS | HEIGHT: 62 IN | HEART RATE: 88 BPM | DIASTOLIC BLOOD PRESSURE: 75 MMHG

## 2018-06-11 DIAGNOSIS — M19.079 ANKLE ARTHRITIS: ICD-10-CM

## 2018-06-11 DIAGNOSIS — M25.572 CHRONIC PAIN OF LEFT ANKLE: Primary | ICD-10-CM

## 2018-06-11 DIAGNOSIS — G89.29 CHRONIC PAIN OF LEFT ANKLE: Primary | ICD-10-CM

## 2018-06-11 PROCEDURE — 1123F ACP DISCUSS/DSCN MKR DOCD: CPT | Performed by: PHYSICIAN ASSISTANT

## 2018-06-11 PROCEDURE — 99213 OFFICE O/P EST LOW 20 MIN: CPT | Performed by: PHYSICIAN ASSISTANT

## 2018-06-11 PROCEDURE — 4040F PNEUMOC VAC/ADMIN/RCVD: CPT | Performed by: PHYSICIAN ASSISTANT

## 2018-06-11 PROCEDURE — G8427 DOCREV CUR MEDS BY ELIG CLIN: HCPCS | Performed by: PHYSICIAN ASSISTANT

## 2018-06-11 PROCEDURE — L1902 AFO ANKLE GAUNTLET PRE OTS: HCPCS | Performed by: PHYSICIAN ASSISTANT

## 2018-06-11 PROCEDURE — 1036F TOBACCO NON-USER: CPT | Performed by: PHYSICIAN ASSISTANT

## 2018-06-11 PROCEDURE — 1090F PRES/ABSN URINE INCON ASSESS: CPT | Performed by: PHYSICIAN ASSISTANT

## 2018-06-11 PROCEDURE — 3017F COLORECTAL CA SCREEN DOC REV: CPT | Performed by: PHYSICIAN ASSISTANT

## 2018-06-11 PROCEDURE — G8419 CALC BMI OUT NRM PARAM NOF/U: HCPCS | Performed by: PHYSICIAN ASSISTANT

## 2018-06-11 PROCEDURE — G8399 PT W/DXA RESULTS DOCUMENT: HCPCS | Performed by: PHYSICIAN ASSISTANT

## 2018-06-13 ENCOUNTER — OFFICE VISIT (OUTPATIENT)
Dept: ORTHOPEDIC SURGERY | Age: 73
End: 2018-06-13

## 2018-06-13 VITALS
DIASTOLIC BLOOD PRESSURE: 70 MMHG | HEART RATE: 76 BPM | SYSTOLIC BLOOD PRESSURE: 135 MMHG | WEIGHT: 138.01 LBS | BODY MASS INDEX: 25.4 KG/M2 | HEIGHT: 62 IN

## 2018-06-13 DIAGNOSIS — M25.562 ACUTE PAIN OF LEFT KNEE: Primary | ICD-10-CM

## 2018-06-13 DIAGNOSIS — M17.12 PRIMARY LOCALIZED OSTEOARTHRITIS OF LEFT KNEE: ICD-10-CM

## 2018-06-13 PROCEDURE — 1090F PRES/ABSN URINE INCON ASSESS: CPT | Performed by: ORTHOPAEDIC SURGERY

## 2018-06-13 PROCEDURE — 3017F COLORECTAL CA SCREEN DOC REV: CPT | Performed by: ORTHOPAEDIC SURGERY

## 2018-06-13 PROCEDURE — G8419 CALC BMI OUT NRM PARAM NOF/U: HCPCS | Performed by: ORTHOPAEDIC SURGERY

## 2018-06-13 PROCEDURE — 4040F PNEUMOC VAC/ADMIN/RCVD: CPT | Performed by: ORTHOPAEDIC SURGERY

## 2018-06-13 PROCEDURE — G8399 PT W/DXA RESULTS DOCUMENT: HCPCS | Performed by: ORTHOPAEDIC SURGERY

## 2018-06-13 PROCEDURE — 99213 OFFICE O/P EST LOW 20 MIN: CPT | Performed by: ORTHOPAEDIC SURGERY

## 2018-06-13 PROCEDURE — 1123F ACP DISCUSS/DSCN MKR DOCD: CPT | Performed by: ORTHOPAEDIC SURGERY

## 2018-06-13 PROCEDURE — G8427 DOCREV CUR MEDS BY ELIG CLIN: HCPCS | Performed by: ORTHOPAEDIC SURGERY

## 2018-06-13 PROCEDURE — 1036F TOBACCO NON-USER: CPT | Performed by: ORTHOPAEDIC SURGERY

## 2018-06-19 ENCOUNTER — OFFICE VISIT (OUTPATIENT)
Dept: ORTHOPEDIC SURGERY | Age: 73
End: 2018-06-19

## 2018-06-19 VITALS
HEIGHT: 62 IN | BODY MASS INDEX: 25.4 KG/M2 | HEART RATE: 82 BPM | SYSTOLIC BLOOD PRESSURE: 153 MMHG | WEIGHT: 138.01 LBS | DIASTOLIC BLOOD PRESSURE: 76 MMHG

## 2018-06-19 DIAGNOSIS — M19.072 OSTEOARTHRITIS OF LEFT SUBTALAR JOINT: Primary | ICD-10-CM

## 2018-06-19 PROCEDURE — G8419 CALC BMI OUT NRM PARAM NOF/U: HCPCS | Performed by: ORTHOPAEDIC SURGERY

## 2018-06-19 PROCEDURE — 3017F COLORECTAL CA SCREEN DOC REV: CPT | Performed by: ORTHOPAEDIC SURGERY

## 2018-06-19 PROCEDURE — 4040F PNEUMOC VAC/ADMIN/RCVD: CPT | Performed by: ORTHOPAEDIC SURGERY

## 2018-06-19 PROCEDURE — 99214 OFFICE O/P EST MOD 30 MIN: CPT | Performed by: ORTHOPAEDIC SURGERY

## 2018-06-19 PROCEDURE — 1123F ACP DISCUSS/DSCN MKR DOCD: CPT | Performed by: ORTHOPAEDIC SURGERY

## 2018-06-19 PROCEDURE — 1036F TOBACCO NON-USER: CPT | Performed by: ORTHOPAEDIC SURGERY

## 2018-06-19 PROCEDURE — 1090F PRES/ABSN URINE INCON ASSESS: CPT | Performed by: ORTHOPAEDIC SURGERY

## 2018-06-19 PROCEDURE — G8399 PT W/DXA RESULTS DOCUMENT: HCPCS | Performed by: ORTHOPAEDIC SURGERY

## 2018-06-19 PROCEDURE — G8427 DOCREV CUR MEDS BY ELIG CLIN: HCPCS | Performed by: ORTHOPAEDIC SURGERY

## 2018-06-20 ENCOUNTER — OFFICE VISIT (OUTPATIENT)
Dept: ORTHOPEDIC SURGERY | Age: 73
End: 2018-06-20

## 2018-06-20 ENCOUNTER — CARE COORDINATION (OUTPATIENT)
Dept: CASE MANAGEMENT | Age: 73
End: 2018-06-20

## 2018-06-20 VITALS
HEIGHT: 62 IN | SYSTOLIC BLOOD PRESSURE: 132 MMHG | HEART RATE: 77 BPM | BODY MASS INDEX: 25.4 KG/M2 | WEIGHT: 138 LBS | DIASTOLIC BLOOD PRESSURE: 74 MMHG

## 2018-06-20 DIAGNOSIS — M17.12 PRIMARY LOCALIZED OSTEOARTHRITIS OF LEFT KNEE: Primary | ICD-10-CM

## 2018-06-20 PROCEDURE — 3017F COLORECTAL CA SCREEN DOC REV: CPT | Performed by: PHYSICIAN ASSISTANT

## 2018-06-20 PROCEDURE — 99214 OFFICE O/P EST MOD 30 MIN: CPT | Performed by: PHYSICIAN ASSISTANT

## 2018-06-20 PROCEDURE — G8419 CALC BMI OUT NRM PARAM NOF/U: HCPCS | Performed by: PHYSICIAN ASSISTANT

## 2018-06-20 PROCEDURE — 1123F ACP DISCUSS/DSCN MKR DOCD: CPT | Performed by: PHYSICIAN ASSISTANT

## 2018-06-20 PROCEDURE — G8428 CUR MEDS NOT DOCUMENT: HCPCS | Performed by: PHYSICIAN ASSISTANT

## 2018-06-20 PROCEDURE — 1090F PRES/ABSN URINE INCON ASSESS: CPT | Performed by: PHYSICIAN ASSISTANT

## 2018-06-20 PROCEDURE — G8399 PT W/DXA RESULTS DOCUMENT: HCPCS | Performed by: PHYSICIAN ASSISTANT

## 2018-06-20 PROCEDURE — 1036F TOBACCO NON-USER: CPT | Performed by: PHYSICIAN ASSISTANT

## 2018-06-20 PROCEDURE — 4040F PNEUMOC VAC/ADMIN/RCVD: CPT | Performed by: PHYSICIAN ASSISTANT

## 2018-06-27 DIAGNOSIS — M25.562 LEFT KNEE PAIN, UNSPECIFIED CHRONICITY: Primary | ICD-10-CM

## 2018-07-19 ENCOUNTER — OFFICE VISIT (OUTPATIENT)
Dept: ORTHOPEDIC SURGERY | Age: 73
End: 2018-07-19

## 2018-07-19 VITALS — BODY MASS INDEX: 25.4 KG/M2 | WEIGHT: 138 LBS | HEIGHT: 62 IN

## 2018-07-19 DIAGNOSIS — M17.12 PRIMARY OSTEOARTHRITIS OF LEFT KNEE: Primary | ICD-10-CM

## 2018-07-19 PROCEDURE — 99213 OFFICE O/P EST LOW 20 MIN: CPT | Performed by: ORTHOPAEDIC SURGERY

## 2018-07-19 PROCEDURE — G8427 DOCREV CUR MEDS BY ELIG CLIN: HCPCS | Performed by: ORTHOPAEDIC SURGERY

## 2018-07-19 PROCEDURE — 1101F PT FALLS ASSESS-DOCD LE1/YR: CPT | Performed by: ORTHOPAEDIC SURGERY

## 2018-07-19 PROCEDURE — G8419 CALC BMI OUT NRM PARAM NOF/U: HCPCS | Performed by: ORTHOPAEDIC SURGERY

## 2018-07-19 PROCEDURE — 3017F COLORECTAL CA SCREEN DOC REV: CPT | Performed by: ORTHOPAEDIC SURGERY

## 2018-07-19 PROCEDURE — 4040F PNEUMOC VAC/ADMIN/RCVD: CPT | Performed by: ORTHOPAEDIC SURGERY

## 2018-07-19 PROCEDURE — 1123F ACP DISCUSS/DSCN MKR DOCD: CPT | Performed by: ORTHOPAEDIC SURGERY

## 2018-07-19 PROCEDURE — 1036F TOBACCO NON-USER: CPT | Performed by: ORTHOPAEDIC SURGERY

## 2018-07-19 PROCEDURE — 1090F PRES/ABSN URINE INCON ASSESS: CPT | Performed by: ORTHOPAEDIC SURGERY

## 2018-07-19 PROCEDURE — G8399 PT W/DXA RESULTS DOCUMENT: HCPCS | Performed by: ORTHOPAEDIC SURGERY

## 2018-07-19 NOTE — PROGRESS NOTES
(CHOLECALCIFEROL) 1000 UNIT TABS tablet Take 1,000 Units by mouth daily      mupirocin (BACTROBAN) 2 % ointment       rivaroxaban (XARELTO) 10 MG TABS tablet Take 1 tablet by mouth daily (with breakfast) for 10 days 10 tablet 0    MELOXICAM PO Take 15 mg by mouth daily       vitamin B-1 (THIAMINE) 100 MG tablet Take 100 mg by mouth daily      Ascorbic Acid (VITAMIN C) 250 MG tablet Take 250 mg by mouth daily      Multiple Vitamin (MULTI-VITAMIN DAILY PO) Take  by mouth.  fluoxetine (PROZAC) 40 MG capsule Take 40 mg by mouth daily. No current facility-administered medications for this visit. Medication Management  Patient has been treated with NSAIDs, Steroids and Analgesics with Minimal relief for 3 years. Review of Systems:  Relevant review of systems reviewed and available in the patient's chart    Vital Signs: There were no vitals filed for this visit. Body mass index is 25.24 kg/m². Limitation in Activities of Daily Living (ADLs)  The patient is able to ambulate without the assistance of cane for 2 steps  steps/feet. Walking distance less than 1 block    Patient needs assistance with activities of daily living bathing and cooking. [facility] mental health facility: No     Safety Issues: Difficulty with daily activities and Risk of falls  Patient is at risk for falls/fall history: Yes    General Exam:   Constitutional: Patient is adequately groomed with no evidence of malnutrition  DTRs: Deep tendon reflexes are intact  Mental Status: The patient is oriented to time, place and person. The patient's mood and affect are appropriate. Lymphatic: The lymphatic examination bilaterally reveals all areas to be without enlargement or induration. Vascular: Examination reveals no swelling or calf tenderness. Peripheral pulses are palpable and 2+. Neurological: The patient has good coordination. There is no weakness or sensory deficit.     Left Knee past 3 years. Impression:  Encounter Diagnosis   Name Primary?  Primary osteoarthritis of left knee Yes       Office Procedures:  No orders of the defined types were placed in this encounter. Treatment Plan:  I discussed with the patient the nature of osteoarthritis of the knee. We talked about treatment of arthritis and the various options that are involved with this. The patient understands that the treatments can vary from essentially doing nothing to a total joint replacement arthroplasty for arthritis. I then went on to describe the utilization of glucosamine and chondroitin sulfate as a joint nutrition product. We talked about the fact that this is essentially a joint vitamin with typically minimal side effects. We also talked about utilization of prescription over-the-counter anti-inflammatory medications as the next option. We also discussed the possibility of brace wear or orthotic wear if the patient has significant varus alignment. We then went on to discuss the possibility of Visco supplementation with hyaluronate products. We talked about the typical course of this type of treatment and the fact that often times in the treatment for significant arthritis, this is successful less than half the time. We also talked about the corticosteroid injections and the fact that this can give a brief window of relief, but does not cure the problem; in fact, the pain often has a rebound effect in 6-10 weeks after the steroid has worn off. We also discussed arthroscopy surgery in attempts to debride the joint, but the fact that this is relatively unreliable treatment in the face of significant arthritis. It can occasionally be used, particularly if there is significant meniscus pathology. Lastly we discussed total joint replacement arthroplasty as the final and definitive step in treatment of arthritis.  Patient realizes the magnitude of this type of treatment as well as having voiced a general understanding

## 2018-07-25 ENCOUNTER — HOSPITAL ENCOUNTER (OUTPATIENT)
Dept: PHYSICAL THERAPY | Age: 73
Setting detail: THERAPIES SERIES
Discharge: HOME | End: 2018-07-25
Payer: MEDICARE

## 2018-07-25 ENCOUNTER — TELEPHONE (OUTPATIENT)
Dept: ORTHOPEDIC SURGERY | Age: 73
End: 2018-07-25

## 2018-07-25 NOTE — TELEPHONE ENCOUNTER
time of surgery (if went through [facility]): Additional Medical Concerns:     Mental Health History- takes a prozac daily. Additional Recommendations for above concerns:      Discharge Disposition Information:     Attended Pre-Hab Program: yes     Anticipated Discharge Disposition:  Home per OV. Has not scheduled out pt PT     Who will be with patient at home following discharge?         Equipment pt already has:  Eulogio Darden on first or second floor: First   Bathroom on first or second floor: First   Weight bearing status: Full   Pre-op ambulatory status: NONE   Number of entry steps: 3 steps   Caregiver assistance: Full time        Heather Goss  7/25/2018

## 2018-08-06 ENCOUNTER — HOSPITAL ENCOUNTER (OUTPATIENT)
Age: 73
End: 2018-08-06

## 2018-08-06 ENCOUNTER — HOSPITAL ENCOUNTER (OUTPATIENT)
Age: 73
Discharge: HOME | End: 2018-08-06
Payer: MEDICARE

## 2018-08-06 LAB
AMORPHOUS: ABNORMAL /HPF
APTT: 29.9 SEC (ref 26–36)
BACTERIA: ABNORMAL /HPF
BILIRUBIN URINE: NEGATIVE
BLOOD, URINE: ABNORMAL
CLARITY: CLEAR
COLOR: YELLOW
EPITHELIAL CELLS, UA: ABNORMAL /HPF
GLUCOSE URINE: NEGATIVE MG/DL
INR BLD: 1.04 (ref 0.86–1.14)
KETONES, URINE: NEGATIVE MG/DL
LEUKOCYTE ESTERASE, URINE: NEGATIVE
MICROSCOPIC EXAMINATION: YES
MUCUS: ABNORMAL /LPF
NITRITE, URINE: NEGATIVE
PH UA: 6
PROTEIN UA: NEGATIVE MG/DL
PROTHROMBIN TIME: 11.8 SEC (ref 9.8–13)
RBC UA: ABNORMAL /HPF (ref 0–2)
SPECIFIC GRAVITY UA: >=1.03
URINE TYPE: ABNORMAL
UROBILINOGEN, URINE: 0.2 E.U./DL
WBC UA: ABNORMAL /HPF (ref 0–5)

## 2018-08-06 PROCEDURE — 80048 BASIC METABOLIC PNL TOTAL CA: CPT

## 2018-08-06 PROCEDURE — 81001 URINALYSIS AUTO W/SCOPE: CPT

## 2018-08-06 PROCEDURE — 84466 ASSAY OF TRANSFERRIN: CPT

## 2018-08-06 PROCEDURE — 85610 PROTHROMBIN TIME: CPT

## 2018-08-06 PROCEDURE — 36415 COLL VENOUS BLD VENIPUNCTURE: CPT

## 2018-08-06 PROCEDURE — 87086 URINE CULTURE/COLONY COUNT: CPT

## 2018-08-06 PROCEDURE — 85025 COMPLETE CBC W/AUTO DIFF WBC: CPT

## 2018-08-06 PROCEDURE — 82040 ASSAY OF SERUM ALBUMIN: CPT

## 2018-08-06 PROCEDURE — 83036 HEMOGLOBIN GLYCOSYLATED A1C: CPT

## 2018-08-06 PROCEDURE — 85730 THROMBOPLASTIN TIME PARTIAL: CPT

## 2018-08-07 LAB
ALBUMIN SERPL-MCNC: 4.5 G/DL (ref 3.4–5)
ANION GAP SERPL CALCULATED.3IONS-SCNC: 12 MMOL/L (ref 3–16)
BASOPHILS ABSOLUTE: 0 K/UL (ref 0–0.2)
BASOPHILS RELATIVE PERCENT: 0.5 %
BUN BLDV-MCNC: 23 MG/DL (ref 7–20)
CALCIUM SERPL-MCNC: 10.2 MG/DL (ref 8.3–10.6)
CHLORIDE BLD-SCNC: 105 MMOL/L (ref 99–110)
CO2: 27 MMOL/L (ref 21–32)
CREAT SERPL-MCNC: 0.7 MG/DL (ref 0.6–1.2)
EOSINOPHILS ABSOLUTE: 0.1 K/UL (ref 0–0.6)
EOSINOPHILS RELATIVE PERCENT: 2 %
ESTIMATED AVERAGE GLUCOSE: 88.2 MG/DL
GFR AFRICAN AMERICAN: >60
GFR NON-AFRICAN AMERICAN: >60
GLUCOSE BLD-MCNC: 96 MG/DL (ref 70–99)
HBA1C MFR BLD: 4.7 %
HCT VFR BLD CALC: 40 % (ref 36–48)
HEMOGLOBIN: 13.3 G/DL (ref 12–16)
LYMPHOCYTES ABSOLUTE: 1.2 K/UL (ref 1–5.1)
LYMPHOCYTES RELATIVE PERCENT: 19.8 %
MCH RBC QN AUTO: 31.2 PG (ref 26–34)
MCHC RBC AUTO-ENTMCNC: 33.3 G/DL (ref 31–36)
MCV RBC AUTO: 93.8 FL (ref 80–100)
MONOCYTES ABSOLUTE: 0.4 K/UL (ref 0–1.3)
MONOCYTES RELATIVE PERCENT: 6.9 %
NEUTROPHILS ABSOLUTE: 4.2 K/UL (ref 1.7–7.7)
NEUTROPHILS RELATIVE PERCENT: 70.8 %
PDW BLD-RTO: 15 % (ref 12.4–15.4)
PLATELET # BLD: 211 K/UL (ref 135–450)
PMV BLD AUTO: 10 FL (ref 5–10.5)
POTASSIUM SERPL-SCNC: 3.9 MMOL/L (ref 3.5–5.1)
RBC # BLD: 4.26 M/UL (ref 4–5.2)
SODIUM BLD-SCNC: 144 MMOL/L (ref 136–145)
TRANSFERRIN: 311 MG/DL (ref 200–360)
URINE CULTURE, ROUTINE: NORMAL
WBC # BLD: 6 K/UL (ref 4–11)

## 2018-08-13 ENCOUNTER — HOSPITAL ENCOUNTER (OUTPATIENT)
Dept: CT IMAGING | Age: 73
Discharge: HOME | End: 2018-08-13
Payer: MEDICARE

## 2018-08-13 DIAGNOSIS — M25.562 LEFT KNEE PAIN, UNSPECIFIED CHRONICITY: ICD-10-CM

## 2018-08-13 PROCEDURE — 73700 CT LOWER EXTREMITY W/O DYE: CPT

## 2018-08-14 ENCOUNTER — HOSPITAL ENCOUNTER (OUTPATIENT)
Dept: PHYSICAL THERAPY | Age: 73
Setting detail: THERAPIES SERIES
Discharge: HOME | End: 2018-08-14
Payer: MEDICARE

## 2018-08-14 PROCEDURE — G8978 MOBILITY CURRENT STATUS: HCPCS

## 2018-08-14 PROCEDURE — 97161 PT EVAL LOW COMPLEX 20 MIN: CPT

## 2018-08-14 PROCEDURE — G8979 MOBILITY GOAL STATUS: HCPCS

## 2018-08-14 PROCEDURE — 97110 THERAPEUTIC EXERCISES: CPT

## 2018-08-14 NOTE — PROGRESS NOTES
Obstructive Sleep Apnea (TESSY) Screening     Patient:  Mortimer Brome    YOB: 1945      Medical Record #:  [id number]                     Date:  8/14/2018     1. Are you a loud and/or regular snorer? []  Yes       [x] No    2. Have you been observed to gasp or stop breathing during sleep? []  Yes       [x] No    3. Do you feel tired or groggy upon awakening or do you awaken with a headache?           []  Yes       [x] No    4. Are you often tired or fatigued during the wake time hours? []  Yes       [x] No    5. Do you fall asleep sitting, reading, watching TV or driving? []  Yes       [x] No    6. Do you often have problems with memory or concentration? []  Yes       [x] No    **If patient's score is ? 3 they are considered high risk for TESSY. Notify the anesthesiologist of the high risk and document in focus note. Note:  If the patient's BMI is more than 35 kg m¯² , has neck circumference > 40 cm, and/or high blood pressure the risk is greater (© American Sleep Apnea Association, 2006).

## 2018-08-14 NOTE — PLAN OF CARE
[facility], [address]  Phone: [phone]  Fax [phone]     Physical Therapy Certification    Dear Referring Practitioner: Dr Gypsy Motta,    We had the pleasure of evaluating the following patient for physical therapy services at [address] [place] [address]. A summary of our findings can be found in the initial assessment below. This includes our plan of care. If you have any questions or concerns regarding these findings, please do not hesitate to contact me at the office phone number checked above. Thank you for the referral.       Physician Signature:_______________________________Date:__________________  By signing above (or electronic signature), therapists plan is approved by physician    Patient: Daphne Galo   : 1945   MRN: [id number]  Referring Physician: Referring Practitioner: Dr Gypsy Motta      Evaluation Date: 2018      Medical Diagnosis Information:  Diagnosis: M17.12 (ICD-10-CM) - Primary localized osteoarthritis of left knee   Treatment Diagnosis: L Knee OA M17.12, L knee pain M25.562                                         Insurance information: PT Insurance Information: [facility]     Precautions/ Contra-indications: R TKR 3/21/18, L STJ OA (wears ankle brace)  Latex Allergy:  [x]NO      []YES  Preferred Language for Healthcare:   [x]English       []other:    SUBJECTIVE: Patient stated complaint of L knee pain that has been worsening over time. She has difficulty walking, standing, sleeping and climbing stairs. Relevant Medical History R TKR 3/21/18, Lumbar Fusion     Functional Disability Index:PT G-Codes  Functional Assessment Tool Used: LEFS  Score: 45%  Functional Limitation: Mobility: Walking and moving around  Mobility: Walking and Moving Around Current Status ():  At least 40 percent but less than 60 percent impaired, limited or restricted  Mobility: Walking evaluation/treatment:    []Excellent   [x]Good    []Fair   []Poor  Physical Therapy Evaluation Complexity Justification  [x] A history of present problem with:  [] no personal factors and/or comorbidities that impact the plan of care;  []1-2 personal factors and/or comorbidities that impact the plan of care  [x]3 personal factors and/or comorbidities that impact the plan of care  [x] An examination of body systems using standardized tests and measures addressing any of the following: body structures and functions (impairments), activity limitations, and/or participation restrictions;:  [x] a total of 1-2 or more elements   [] a total of 3 or more elements   [] a total of 4 or more elements   [x] A clinical presentation with:  [x] stable and/or uncomplicated characteristics   [] evolving clinical presentation with changing characteristics  [] unstable and unpredictable characteristics;   [x] Clinical decision making of [x] low, [] moderate, [] high complexity using standardized patient assessment instrument and/or measurable assessment of functional outcome. [x] EVAL (LOW) 83855 (typically 20 minutes face-to-face)  [] EVAL (MOD) 94379 (typically 30 minutes face-to-face)  [] EVAL (HIGH) 23172 (typically 45 minutes face-to-face)  [] RE-EVAL       PLAN:   Frequency/Duration:  PT EVAL (Pre-hab) with HEP  Interventions:  [x]  Therapeutic exercise including: strength training, ROM, for Lower extremity and core   [x]  NMR activation and proprioception for LE, Glutes and Core   []  Manual therapy as indicated for LE, Hip and spine to include: Dry Needling/IASTM, STM, PROM, Gr I-IV mobilizations, manipulation. [] Modalities as needed that may include: thermal agents, E-stim, Biofeedback, US, iontophoresis as indicated  [] Patient education on joint protection, postural re-education, activity modification, progression of HEP.     HEP instruction: (see scanned forms)    GOALS:  Patient stated goal:Decrease pain    Therapist

## 2018-08-14 NOTE — FLOWSHEET NOTE
[facility],  [address]  Phone: [phone]  Fax [phone]    Physical Therapy Daily Treatment Note  Date:  2018    Patient Name:  Cinda Lesch    :  1945  MRN: [id number]  Restrictions/Precautions:    Medical/Treatment Diagnosis Information:  Diagnosis: M17.12 (ICD-10-CM) - Primary localized osteoarthritis of left knee  Treatment Diagnosis: L Knee OA M17.12, L knee pain R45.892  Insurance/Certification information:  PT Insurance Information: [facility]  Physician Information:  Referring Practitioner: Dr Tavo Hammer of care signed (Y/N):     Date of Patient follow up with Physician:     G-Code (if applicable):      Date G-Code Applied:    PT G-Codes  Functional Assessment Tool Used: LEFS  Score: 45%  Functional Limitation: Mobility: Walking and moving around  Mobility: Walking and Moving Around Current Status (): At least 40 percent but less than 60 percent impaired, limited or restricted  Mobility: Walking and Moving Around Goal Status ():  At least 20 percent but less than 40 percent impaired, limited or restricted    Progress Note: [x]  Yes  []  No  Next due by: Visit #10       Latex Allergy:  [x]NO      []YES  Preferred Language for Healthcare:   [x]English       []other:    Visit # Insurance Allowable Requires auth   1 [facility]    []no        []yes:       Pain level:  1-1010     SUBJECTIVE:  See eval    OBJECTIVE: See eval  Observation:   Test measurements:      RESTRICTIONS/PRECAUTIONS: Depression, L STJ OA (wears ankle brace), R TKR 3/21/18  Exercises/Interventions:     Therapeutic Ex Sets/sec Reps Notes   Heelslides 10 sec 10 HEP   Gastroc Belt Stretch 30 sec 3 HEP   Long Sit HS S 30 sec 3 HEP   B QS 10 sec 10 HEP   SLR 3 10 HEP   LAQ 3 10 HEP   Functional Chair Squats 3 10 HEP                           Manual Intervention                                          NMR re-education Therapeutic Exercise and NMR EXR  [x] (85591) Provided verbal/tactile cueing for activities related to strengthening, flexibility, endurance, ROM for improvements in LE, proximal hip, and core control with self care, mobility, lifting, ambulation.  [] (12922) Provided verbal/tactile cueing for activities related to improving balance, coordination, kinesthetic sense, posture, motor skill, proprioception  to assist with LE, proximal hip, and core control in self care, mobility, lifting, ambulation and eccentric single leg control.      NMR and Therapeutic Activities:    [] (22365 or 97474) Provided verbal/tactile cueing for activities related to improving balance, coordination, kinesthetic sense, posture, motor skill, proprioception and motor activation to allow for proper function of core, proximal hip and LE with self care and ADLs  [] (40491) Gait Re-education- Provided training and instruction to the patient for proper LE, core and proximal hip recruitment and positioning and eccentric body weight control with ambulation re-education including up and down stairs     Home Exercise Program:    [x] (14699) Reviewed/Progressed HEP activities related to strengthening, flexibility, endurance, ROM of core, proximal hip and LE for functional self-care, mobility, lifting and ambulation/stair navigation   [] (06294)Reviewed/Progressed HEP activities related to improving balance, coordination, kinesthetic sense, posture, motor skill, proprioception of core, proximal hip and LE for self care, mobility, lifting, and ambulation/stair navigation      Manual Treatments:  PROM / STM / Oscillations-Mobs:  G-I, II, III, IV (PA's, Inf., Post.)  [] (13273) Provided manual therapy to mobilize LE, proximal hip and/or LS spine soft tissue/joints for the purpose of modulating pain, promoting relaxation,  increasing ROM, reducing/eliminating soft tissue swelling/inflammation/restriction, improving soft tissue

## 2018-08-17 ENCOUNTER — ANESTHESIA EVENT (OUTPATIENT)
Dept: OPERATING ROOM | Age: 73
End: 2018-08-17
Payer: MEDICARE

## 2018-08-20 NOTE — ANESTHESIA PRE PROCEDURE
Department of Anesthesiology  Preprocedure Note       Name:  Shereen Kruse   Age:  68 y.o.  :  1945                                          MRN:  [id number]         Date:  2018      Surgeon:   Shavonne Husain MD    Procedure:   LEFT TOTAL KNEE MAKOPLASTY WITH PLATELET RICH PLASMA    HPI:  This is a 68 y.o. female with ongoing left knee pain. He did a right total knee replacement on her back in March and is doing very well. Unfortunately, shortly after her surgery her left knee became more symptomatic. Prior to this she had had cortisone injections, bracing, and physical therapy for the left and right knee. She now complains of her pain to be a 9/10 with weightbearing activities such as walking, standing, stairs. It severely affecting her quality of life causing limitations with daily activities as well as sleep disturbances at night. She is reporting frequent episodes of instability and giving way. Her pain can be as high as 9/10. Medications prior to admission:   aspirin 81 MG tablet Take 81 mg by mouth every evening   mupirocin (BACTROBAN) 2 % ointment  Apply 1 cm (small drop) to a q-tip and swab the inside of each nostril twice a day starting 5 days prior to surgery. vitamin D (CHOLECALCIFEROL) Take 1,000 Units by mouth daily   MELOXICAM PO Take 15 mg by mouth daily    vitamin B-1 (THIAMINE) 100 MG tablet Take 100 mg by mouth daily   Ascorbic Acid (VITAMIN C) 250 MG tablet Take 250 mg by mouth daily   Multiple Vitamin (MULTI-VITAMIN DAILY PO) Take  by mouth. fluoxetine (PROZAC) 40 MG capsule Take 40 mg by mouth nightly      Allergies:     Indocin [Indomethacin] Other (See Comments) and Nausea And Vomiting     This allergy is listed on her history and physical form. Patient unaware of this medication.      Problem List:     Carpal tunnel syndrome of left wrist G56.02    Primary localized osteoarthrosis of right lower leg M17.11    Primary osteoarthritis of right knee M17.11    (-) GERD and no renal disease      ROS comment: Occasional heartburn- OTC meds only. Endo/Other:        (-) diabetes mellitus, hypothyroidism               Abdominal:           Vascular:     - DVT and PE. Anesthesia Plan      general     ASA 2     (Adductor Canal and IPACK blocks for post-op pain management per surgeon request)  Induction: intravenous. Anesthetic plan and risks discussed with patient. Plan discussed with CRNA.           Nataly Carter MD

## 2018-08-21 ENCOUNTER — HOSPITAL ENCOUNTER (OUTPATIENT)
Age: 73
Discharge: HOME | End: 2018-08-22
Attending: ORTHOPAEDIC SURGERY | Admitting: ORTHOPAEDIC SURGERY
Payer: MEDICARE

## 2018-08-21 ENCOUNTER — ANESTHESIA (OUTPATIENT)
Dept: OPERATING ROOM | Age: 73
End: 2018-08-21
Payer: MEDICARE

## 2018-08-21 VITALS — DIASTOLIC BLOOD PRESSURE: 51 MMHG | OXYGEN SATURATION: 100 % | SYSTOLIC BLOOD PRESSURE: 87 MMHG

## 2018-08-21 DIAGNOSIS — M17.12 PRIMARY OSTEOARTHRITIS OF LEFT KNEE: Primary | ICD-10-CM

## 2018-08-21 DIAGNOSIS — M17.12 LOCALIZED OSTEOARTHRITIS OF LEFT LOWER LEG: ICD-10-CM

## 2018-08-21 DIAGNOSIS — Z96.652 STATUS POST TOTAL LEFT KNEE REPLACEMENT: Primary | ICD-10-CM

## 2018-08-21 LAB
ABO/RH: NORMAL
ANTIBODY SCREEN: NORMAL
GLUCOSE BLD-MCNC: 132 MG/DL (ref 70–99)
GLUCOSE BLD-MCNC: 143 MG/DL (ref 70–99)
PERFORMED ON: ABNORMAL
PERFORMED ON: ABNORMAL

## 2018-08-21 PROCEDURE — 2580000003 HC RX 258: Performed by: ANESTHESIOLOGY

## 2018-08-21 PROCEDURE — 6360000002 HC RX W HCPCS: Performed by: ANESTHESIOLOGY

## 2018-08-21 PROCEDURE — 6360000002 HC RX W HCPCS: Performed by: ORTHOPAEDIC SURGERY

## 2018-08-21 PROCEDURE — 86900 BLOOD TYPING SEROLOGIC ABO: CPT

## 2018-08-21 PROCEDURE — 2500000003 HC RX 250 WO HCPCS: Performed by: NURSE ANESTHETIST, CERTIFIED REGISTERED

## 2018-08-21 PROCEDURE — 2580000003 HC RX 258: Performed by: ORTHOPAEDIC SURGERY

## 2018-08-21 PROCEDURE — 36415 COLL VENOUS BLD VENIPUNCTURE: CPT

## 2018-08-21 PROCEDURE — 88305 TISSUE EXAM BY PATHOLOGIST: CPT

## 2018-08-21 PROCEDURE — 3700000001 HC ADD 15 MINUTES (ANESTHESIA): Performed by: ORTHOPAEDIC SURGERY

## 2018-08-21 PROCEDURE — C1776 JOINT DEVICE (IMPLANTABLE): HCPCS | Performed by: ORTHOPAEDIC SURGERY

## 2018-08-21 PROCEDURE — 2720000001 HC MISC SURG SUPPLY STERILE $51-500: Performed by: ORTHOPAEDIC SURGERY

## 2018-08-21 PROCEDURE — 86850 RBC ANTIBODY SCREEN: CPT

## 2018-08-21 PROCEDURE — 2580000003 HC RX 258: Performed by: PHYSICIAN ASSISTANT

## 2018-08-21 PROCEDURE — C1713 ANCHOR/SCREW BN/BN,TIS/BN: HCPCS | Performed by: ORTHOPAEDIC SURGERY

## 2018-08-21 PROCEDURE — 7100000001 HC PACU RECOVERY - ADDTL 15 MIN: Performed by: ORTHOPAEDIC SURGERY

## 2018-08-21 PROCEDURE — 94664 DEMO&/EVAL PT USE INHALER: CPT

## 2018-08-21 PROCEDURE — 7100000000 HC PACU RECOVERY - FIRST 15 MIN: Performed by: ORTHOPAEDIC SURGERY

## 2018-08-21 PROCEDURE — 2500000003 HC RX 250 WO HCPCS

## 2018-08-21 PROCEDURE — 2709999900 HC NON-CHARGEABLE SUPPLY: Performed by: ORTHOPAEDIC SURGERY

## 2018-08-21 PROCEDURE — 94150 VITAL CAPACITY TEST: CPT

## 2018-08-21 PROCEDURE — 6370000000 HC RX 637 (ALT 250 FOR IP): Performed by: ANESTHESIOLOGY

## 2018-08-21 PROCEDURE — L1830 KO IMMOB CANVAS LONG PRE OTS: HCPCS | Performed by: ORTHOPAEDIC SURGERY

## 2018-08-21 PROCEDURE — 2500000003 HC RX 250 WO HCPCS: Performed by: ORTHOPAEDIC SURGERY

## 2018-08-21 PROCEDURE — C9290 INJ, BUPIVACAINE LIPOSOME: HCPCS | Performed by: ORTHOPAEDIC SURGERY

## 2018-08-21 PROCEDURE — 6370000000 HC RX 637 (ALT 250 FOR IP): Performed by: PHYSICIAN ASSISTANT

## 2018-08-21 PROCEDURE — 6360000002 HC RX W HCPCS: Performed by: PHYSICIAN ASSISTANT

## 2018-08-21 PROCEDURE — 64447 NJX AA&/STRD FEMORAL NRV IMG: CPT | Performed by: ANESTHESIOLOGY

## 2018-08-21 PROCEDURE — 6360000002 HC RX W HCPCS: Performed by: NURSE ANESTHETIST, CERTIFIED REGISTERED

## 2018-08-21 PROCEDURE — 86901 BLOOD TYPING SEROLOGIC RH(D): CPT

## 2018-08-21 PROCEDURE — S2900 ROBOTIC SURGICAL SYSTEM: HCPCS | Performed by: ORTHOPAEDIC SURGERY

## 2018-08-21 PROCEDURE — 2740000010 HC MISC ORTHOTIC: Performed by: ORTHOPAEDIC SURGERY

## 2018-08-21 PROCEDURE — 1200000000 HC SEMI PRIVATE

## 2018-08-21 PROCEDURE — 3600000009 HC SURGERY ROBOT BASE: Performed by: ORTHOPAEDIC SURGERY

## 2018-08-21 PROCEDURE — 2720000010 HC SURG SUPPLY STERILE: Performed by: ORTHOPAEDIC SURGERY

## 2018-08-21 PROCEDURE — 6370000000 HC RX 637 (ALT 250 FOR IP)

## 2018-08-21 PROCEDURE — 3700000000 HC ANESTHESIA ATTENDED CARE: Performed by: ORTHOPAEDIC SURGERY

## 2018-08-21 PROCEDURE — 88311 DECALCIFY TISSUE: CPT

## 2018-08-21 PROCEDURE — 3600000019 HC SURGERY ROBOT ADDTL 15MIN: Performed by: ORTHOPAEDIC SURGERY

## 2018-08-21 DEVICE — COMPONENT PAT DIA33MM THK9MM KNEE X3 SYMMETRIC TRIATHLON: Type: IMPLANTABLE DEVICE | Site: KNEE | Status: FUNCTIONAL

## 2018-08-21 DEVICE — CEMENT BNE 20ML 41GM FULL DOSE PMMA W/ TOBRA M VISC RADPQ: Type: IMPLANTABLE DEVICE | Site: KNEE | Status: FUNCTIONAL

## 2018-08-21 DEVICE — BASEPLATE TIB SZ 4 KNEE TRITANIUM CEM PRI LO PROF TRIATHLON: Type: IMPLANTABLE DEVICE | Site: KNEE | Status: FUNCTIONAL

## 2018-08-21 DEVICE — IMPLANTABLE DEVICE: Type: IMPLANTABLE DEVICE | Site: KNEE | Status: FUNCTIONAL

## 2018-08-21 DEVICE — INSERT TIB BEAR SZ 4 THK13MM KNEE X3 CNDYL STABILIZING: Type: IMPLANTABLE DEVICE | Site: KNEE | Status: FUNCTIONAL

## 2018-08-21 RX ORDER — ONDANSETRON 2 MG/ML
4 INJECTION INTRAMUSCULAR; INTRAVENOUS
Status: DISCONTINUED | OUTPATIENT
Start: 2018-08-21 | End: 2018-08-21 | Stop reason: HOSPADM

## 2018-08-21 RX ORDER — KETOROLAC TROMETHAMINE 30 MG/ML
15 INJECTION, SOLUTION INTRAMUSCULAR; INTRAVENOUS EVERY 6 HOURS
Status: DISCONTINUED | OUTPATIENT
Start: 2018-08-21 | End: 2018-08-22 | Stop reason: HOSPADM

## 2018-08-21 RX ORDER — ACETAMINOPHEN 500 MG
1000 TABLET ORAL ONCE
Status: COMPLETED | OUTPATIENT
Start: 2018-08-21 | End: 2018-08-21

## 2018-08-21 RX ORDER — HYDRALAZINE HYDROCHLORIDE 20 MG/ML
5 INJECTION INTRAMUSCULAR; INTRAVENOUS EVERY 10 MIN PRN
Status: DISCONTINUED | OUTPATIENT
Start: 2018-08-21 | End: 2018-08-21 | Stop reason: HOSPADM

## 2018-08-21 RX ORDER — THIAMINE MONONITRATE (VIT B1) 100 MG
100 TABLET ORAL DAILY
Status: DISCONTINUED | OUTPATIENT
Start: 2018-08-21 | End: 2018-08-22 | Stop reason: HOSPADM

## 2018-08-21 RX ORDER — OXYCODONE HYDROCHLORIDE AND ACETAMINOPHEN 5; 325 MG/1; MG/1
2 TABLET ORAL PRN
Status: DISCONTINUED | OUTPATIENT
Start: 2018-08-21 | End: 2018-08-21 | Stop reason: HOSPADM

## 2018-08-21 RX ORDER — DEXAMETHASONE SODIUM PHOSPHATE 4 MG/ML
INJECTION, SOLUTION INTRA-ARTICULAR; INTRALESIONAL; INTRAMUSCULAR; INTRAVENOUS; SOFT TISSUE PRN
Status: DISCONTINUED | OUTPATIENT
Start: 2018-08-21 | End: 2018-08-21 | Stop reason: SDUPTHER

## 2018-08-21 RX ORDER — SODIUM CHLORIDE 0.9 % (FLUSH) 0.9 %
10 SYRINGE (ML) INJECTION EVERY 12 HOURS SCHEDULED
Status: DISCONTINUED | OUTPATIENT
Start: 2018-08-21 | End: 2018-08-21 | Stop reason: HOSPADM

## 2018-08-21 RX ORDER — SODIUM CHLORIDE 0.9 % (FLUSH) 0.9 %
10 SYRINGE (ML) INJECTION PRN
Status: DISCONTINUED | OUTPATIENT
Start: 2018-08-21 | End: 2018-08-22 | Stop reason: HOSPADM

## 2018-08-21 RX ORDER — SODIUM CHLORIDE, SODIUM LACTATE, POTASSIUM CHLORIDE, CALCIUM CHLORIDE 600; 310; 30; 20 MG/100ML; MG/100ML; MG/100ML; MG/100ML
INJECTION, SOLUTION INTRAVENOUS CONTINUOUS PRN
Status: DISCONTINUED | OUTPATIENT
Start: 2018-08-21 | End: 2018-08-21 | Stop reason: SDUPTHER

## 2018-08-21 RX ORDER — FENTANYL CITRATE 50 UG/ML
INJECTION, SOLUTION INTRAMUSCULAR; INTRAVENOUS PRN
Status: DISCONTINUED | OUTPATIENT
Start: 2018-08-21 | End: 2018-08-21 | Stop reason: SDUPTHER

## 2018-08-21 RX ORDER — DEXTROSE MONOHYDRATE 50 MG/ML
100 INJECTION, SOLUTION INTRAVENOUS PRN
Status: DISCONTINUED | OUTPATIENT
Start: 2018-08-21 | End: 2018-08-22 | Stop reason: HOSPADM

## 2018-08-21 RX ORDER — ACETAMINOPHEN 325 MG/1
650 TABLET ORAL EVERY 4 HOURS PRN
Status: DISCONTINUED | OUTPATIENT
Start: 2018-08-21 | End: 2018-08-22 | Stop reason: HOSPADM

## 2018-08-21 RX ORDER — ONDANSETRON 2 MG/ML
INJECTION INTRAMUSCULAR; INTRAVENOUS PRN
Status: DISCONTINUED | OUTPATIENT
Start: 2018-08-21 | End: 2018-08-21 | Stop reason: SDUPTHER

## 2018-08-21 RX ORDER — OXYCODONE HYDROCHLORIDE AND ACETAMINOPHEN 5; 325 MG/1; MG/1
1 TABLET ORAL PRN
Status: DISCONTINUED | OUTPATIENT
Start: 2018-08-21 | End: 2018-08-21 | Stop reason: HOSPADM

## 2018-08-21 RX ORDER — FLUOXETINE HYDROCHLORIDE 20 MG/1
40 CAPSULE ORAL NIGHTLY
Status: DISCONTINUED | OUTPATIENT
Start: 2018-08-21 | End: 2018-08-22 | Stop reason: HOSPADM

## 2018-08-21 RX ORDER — SENNA AND DOCUSATE SODIUM 50; 8.6 MG/1; MG/1
1 TABLET, FILM COATED ORAL DAILY
Status: DISCONTINUED | OUTPATIENT
Start: 2018-08-21 | End: 2018-08-22 | Stop reason: HOSPADM

## 2018-08-21 RX ORDER — SODIUM CHLORIDE, SODIUM LACTATE, POTASSIUM CHLORIDE, CALCIUM CHLORIDE 600; 310; 30; 20 MG/100ML; MG/100ML; MG/100ML; MG/100ML
INJECTION, SOLUTION INTRAVENOUS CONTINUOUS
Status: DISCONTINUED | OUTPATIENT
Start: 2018-08-21 | End: 2018-08-22 | Stop reason: HOSPADM

## 2018-08-21 RX ORDER — CYCLOBENZAPRINE HCL 10 MG
10 TABLET ORAL 3 TIMES DAILY PRN
Status: DISCONTINUED | OUTPATIENT
Start: 2018-08-21 | End: 2018-08-22 | Stop reason: HOSPADM

## 2018-08-21 RX ORDER — GLYCOPYRROLATE 0.2 MG/ML
INJECTION INTRAMUSCULAR; INTRAVENOUS PRN
Status: DISCONTINUED | OUTPATIENT
Start: 2018-08-21 | End: 2018-08-21 | Stop reason: SDUPTHER

## 2018-08-21 RX ORDER — MORPHINE SULFATE 2 MG/ML
2 INJECTION, SOLUTION INTRAMUSCULAR; INTRAVENOUS
Status: DISCONTINUED | OUTPATIENT
Start: 2018-08-21 | End: 2018-08-22 | Stop reason: HOSPADM

## 2018-08-21 RX ORDER — LIDOCAINE HYDROCHLORIDE 10 MG/ML
1 INJECTION, SOLUTION EPIDURAL; INFILTRATION; INTRACAUDAL; PERINEURAL
Status: DISCONTINUED | OUTPATIENT
Start: 2018-08-21 | End: 2018-08-21 | Stop reason: HOSPADM

## 2018-08-21 RX ORDER — SODIUM CHLORIDE 0.9 % (FLUSH) 0.9 %
10 SYRINGE (ML) INJECTION EVERY 12 HOURS SCHEDULED
Status: DISCONTINUED | OUTPATIENT
Start: 2018-08-21 | End: 2018-08-22 | Stop reason: HOSPADM

## 2018-08-21 RX ORDER — ONDANSETRON 2 MG/ML
4 INJECTION INTRAMUSCULAR; INTRAVENOUS EVERY 6 HOURS PRN
Status: DISCONTINUED | OUTPATIENT
Start: 2018-08-21 | End: 2018-08-22 | Stop reason: HOSPADM

## 2018-08-21 RX ORDER — DEXTROSE MONOHYDRATE 25 G/50ML
12.5 INJECTION, SOLUTION INTRAVENOUS PRN
Status: DISCONTINUED | OUTPATIENT
Start: 2018-08-21 | End: 2018-08-22 | Stop reason: HOSPADM

## 2018-08-21 RX ORDER — LIDOCAINE HYDROCHLORIDE 20 MG/ML
INJECTION, SOLUTION EPIDURAL; INFILTRATION; INTRACAUDAL; PERINEURAL PRN
Status: DISCONTINUED | OUTPATIENT
Start: 2018-08-21 | End: 2018-08-21 | Stop reason: SDUPTHER

## 2018-08-21 RX ORDER — ACETAMINOPHEN 650 MG
TABLET, EXTENDED RELEASE ORAL PRN
Status: DISCONTINUED | OUTPATIENT
Start: 2018-08-21 | End: 2018-08-21 | Stop reason: HOSPADM

## 2018-08-21 RX ORDER — SODIUM CHLORIDE 0.9 % (FLUSH) 0.9 %
10 SYRINGE (ML) INJECTION PRN
Status: DISCONTINUED | OUTPATIENT
Start: 2018-08-21 | End: 2018-08-21 | Stop reason: HOSPADM

## 2018-08-21 RX ORDER — FENTANYL CITRATE 50 UG/ML
25 INJECTION, SOLUTION INTRAMUSCULAR; INTRAVENOUS EVERY 5 MIN PRN
Status: DISCONTINUED | OUTPATIENT
Start: 2018-08-21 | End: 2018-08-21 | Stop reason: HOSPADM

## 2018-08-21 RX ORDER — LIDOCAINE HYDROCHLORIDE 10 MG/ML
0.3 INJECTION, SOLUTION EPIDURAL; INFILTRATION; INTRACAUDAL; PERINEURAL
Status: DISCONTINUED | OUTPATIENT
Start: 2018-08-21 | End: 2018-08-21 | Stop reason: HOSPADM

## 2018-08-21 RX ORDER — MEPERIDINE HYDROCHLORIDE 50 MG/ML
12.5 INJECTION INTRAMUSCULAR; INTRAVENOUS; SUBCUTANEOUS EVERY 5 MIN PRN
Status: DISCONTINUED | OUTPATIENT
Start: 2018-08-21 | End: 2018-08-21 | Stop reason: HOSPADM

## 2018-08-21 RX ORDER — NICOTINE POLACRILEX 4 MG
15 LOZENGE BUCCAL PRN
Status: DISCONTINUED | OUTPATIENT
Start: 2018-08-21 | End: 2018-08-22 | Stop reason: HOSPADM

## 2018-08-21 RX ORDER — OXYCODONE HYDROCHLORIDE AND ACETAMINOPHEN 5; 325 MG/1; MG/1
2 TABLET ORAL EVERY 4 HOURS PRN
Status: DISCONTINUED | OUTPATIENT
Start: 2018-08-21 | End: 2018-08-22 | Stop reason: HOSPADM

## 2018-08-21 RX ORDER — PROPOFOL 10 MG/ML
INJECTION, EMULSION INTRAVENOUS PRN
Status: DISCONTINUED | OUTPATIENT
Start: 2018-08-21 | End: 2018-08-21 | Stop reason: SDUPTHER

## 2018-08-21 RX ORDER — FAMOTIDINE 20 MG/1
20 TABLET, FILM COATED ORAL DAILY
Status: DISCONTINUED | OUTPATIENT
Start: 2018-08-21 | End: 2018-08-22 | Stop reason: HOSPADM

## 2018-08-21 RX ORDER — OXYCODONE HYDROCHLORIDE AND ACETAMINOPHEN 5; 325 MG/1; MG/1
1 TABLET ORAL EVERY 4 HOURS PRN
Status: DISCONTINUED | OUTPATIENT
Start: 2018-08-21 | End: 2018-08-22 | Stop reason: HOSPADM

## 2018-08-21 RX ORDER — MORPHINE SULFATE 4 MG/ML
4 INJECTION, SOLUTION INTRAMUSCULAR; INTRAVENOUS
Status: DISCONTINUED | OUTPATIENT
Start: 2018-08-21 | End: 2018-08-22 | Stop reason: HOSPADM

## 2018-08-21 RX ADMIN — PHENYLEPHRINE HYDROCHLORIDE 100 MCG: 10 INJECTION INTRAVENOUS at 13:53

## 2018-08-21 RX ADMIN — Medication 2 G: at 18:11

## 2018-08-21 RX ADMIN — FENTANYL CITRATE 25 MCG: 50 INJECTION INTRAMUSCULAR; INTRAVENOUS at 12:26

## 2018-08-21 RX ADMIN — FENTANYL CITRATE 50 MCG: 50 INJECTION INTRAMUSCULAR; INTRAVENOUS at 13:18

## 2018-08-21 RX ADMIN — LIDOCAINE HYDROCHLORIDE 40 MG: 20 INJECTION, SOLUTION EPIDURAL; INFILTRATION; INTRACAUDAL; PERINEURAL at 12:26

## 2018-08-21 RX ADMIN — Medication 2 G: at 12:31

## 2018-08-21 RX ADMIN — SODIUM CHLORIDE, POTASSIUM CHLORIDE, SODIUM LACTATE AND CALCIUM CHLORIDE: 600; 310; 30; 20 INJECTION, SOLUTION INTRAVENOUS at 11:31

## 2018-08-21 RX ADMIN — PHENYLEPHRINE HYDROCHLORIDE 100 MCG: 10 INJECTION INTRAVENOUS at 13:45

## 2018-08-21 RX ADMIN — SODIUM CHLORIDE, POTASSIUM CHLORIDE, SODIUM LACTATE AND CALCIUM CHLORIDE: 600; 310; 30; 20 INJECTION, SOLUTION INTRAVENOUS at 18:11

## 2018-08-21 RX ADMIN — SODIUM CHLORIDE, POTASSIUM CHLORIDE, SODIUM LACTATE AND CALCIUM CHLORIDE: 600; 310; 30; 20 INJECTION, SOLUTION INTRAVENOUS at 22:03

## 2018-08-21 RX ADMIN — ONDANSETRON 4 MG: 2 INJECTION INTRAMUSCULAR; INTRAVENOUS at 12:26

## 2018-08-21 RX ADMIN — SODIUM CHLORIDE, POTASSIUM CHLORIDE, SODIUM LACTATE AND CALCIUM CHLORIDE: 600; 310; 30; 20 INJECTION, SOLUTION INTRAVENOUS at 11:30

## 2018-08-21 RX ADMIN — HYDROMORPHONE HYDROCHLORIDE 0.5 MG: 1 INJECTION, SOLUTION INTRAMUSCULAR; INTRAVENOUS; SUBCUTANEOUS at 15:27

## 2018-08-21 RX ADMIN — Medication 100 MG: at 18:11

## 2018-08-21 RX ADMIN — KETOROLAC TROMETHAMINE 15 MG: 30 INJECTION, SOLUTION INTRAMUSCULAR at 18:11

## 2018-08-21 RX ADMIN — PROPOFOL 200 MG: 10 INJECTION, EMULSION INTRAVENOUS at 12:26

## 2018-08-21 RX ADMIN — DOCUSATE SODIUM AND SENNOSIDES 1 TABLET: 8.6; 5 TABLET, FILM COATED ORAL at 18:11

## 2018-08-21 RX ADMIN — DEXAMETHASONE SODIUM PHOSPHATE 8 MG: 4 INJECTION, SOLUTION INTRAMUSCULAR; INTRAVENOUS at 12:26

## 2018-08-21 RX ADMIN — ACETAMINOPHEN 1000 MG: 500 TABLET ORAL at 11:31

## 2018-08-21 RX ADMIN — GLYCOPYRROLATE 0.2 MG: 0.2 INJECTION, SOLUTION INTRAMUSCULAR; INTRAVENOUS at 12:36

## 2018-08-21 RX ADMIN — GLYCOPYRROLATE 0.2 MG: 0.2 INJECTION, SOLUTION INTRAMUSCULAR; INTRAVENOUS at 12:33

## 2018-08-21 RX ADMIN — FLUOXETINE 40 MG: 20 CAPSULE ORAL at 22:04

## 2018-08-21 RX ADMIN — SODIUM CHLORIDE, POTASSIUM CHLORIDE, SODIUM LACTATE AND CALCIUM CHLORIDE: 600; 310; 30; 20 INJECTION, SOLUTION INTRAVENOUS at 13:53

## 2018-08-21 RX ADMIN — PHENYLEPHRINE HYDROCHLORIDE 100 MCG: 10 INJECTION INTRAVENOUS at 13:57

## 2018-08-21 RX ADMIN — HYDROMORPHONE HYDROCHLORIDE 0.5 MG: 1 INJECTION, SOLUTION INTRAMUSCULAR; INTRAVENOUS; SUBCUTANEOUS at 15:15

## 2018-08-21 RX ADMIN — KETOROLAC TROMETHAMINE 15 MG: 30 INJECTION, SOLUTION INTRAMUSCULAR at 23:23

## 2018-08-21 RX ADMIN — VITAMIN D, TAB 1000IU (100/BT) 1000 UNITS: 25 TAB at 18:10

## 2018-08-21 RX ADMIN — FAMOTIDINE 20 MG: 20 TABLET ORAL at 18:11

## 2018-08-21 RX ADMIN — FENTANYL CITRATE 50 MCG: 50 INJECTION INTRAMUSCULAR; INTRAVENOUS at 13:12

## 2018-08-21 RX ADMIN — FENTANYL CITRATE 50 MCG: 50 INJECTION INTRAMUSCULAR; INTRAVENOUS at 12:51

## 2018-08-21 RX ADMIN — Medication: at 22:04

## 2018-08-21 RX ADMIN — FENTANYL CITRATE 25 MCG: 50 INJECTION INTRAMUSCULAR; INTRAVENOUS at 12:37

## 2018-08-21 ASSESSMENT — PULMONARY FUNCTION TESTS
PIF_VALUE: 15
PIF_VALUE: 4
PIF_VALUE: 4
PIF_VALUE: 3
PIF_VALUE: 15
PIF_VALUE: 3
PIF_VALUE: 15
PIF_VALUE: 15
PIF_VALUE: 4
PIF_VALUE: 11
PIF_VALUE: 3
PIF_VALUE: 13
PIF_VALUE: 3
PIF_VALUE: 2
PIF_VALUE: 3
PIF_VALUE: 3
PIF_VALUE: 4
PIF_VALUE: 4
PIF_VALUE: 3
PIF_VALUE: 11
PIF_VALUE: 4
PIF_VALUE: 3
PIF_VALUE: 3
PIF_VALUE: 4
PIF_VALUE: 15
PIF_VALUE: 3
PIF_VALUE: 4
PIF_VALUE: 2
PIF_VALUE: 3
PIF_VALUE: 4
PIF_VALUE: 3
PIF_VALUE: 4
PIF_VALUE: 4
PIF_VALUE: 3
PIF_VALUE: 3
PIF_VALUE: 1
PIF_VALUE: 4
PIF_VALUE: 11
PIF_VALUE: 11
PIF_VALUE: 5
PIF_VALUE: 11
PIF_VALUE: 12
PIF_VALUE: 3
PIF_VALUE: 14
PIF_VALUE: 4
PIF_VALUE: 4
PIF_VALUE: 17
PIF_VALUE: 4
PIF_VALUE: 3
PIF_VALUE: 4
PIF_VALUE: 11
PIF_VALUE: 4
PIF_VALUE: 3
PIF_VALUE: 1
PIF_VALUE: 11
PIF_VALUE: 3
PIF_VALUE: 3
PIF_VALUE: 11
PIF_VALUE: 3
PIF_VALUE: 11
PIF_VALUE: 4
PIF_VALUE: 3
PIF_VALUE: 4
PIF_VALUE: 4
PIF_VALUE: 0
PIF_VALUE: 4
PIF_VALUE: 3
PIF_VALUE: 3
PIF_VALUE: 4
PIF_VALUE: 3
PIF_VALUE: 11
PIF_VALUE: 3
PIF_VALUE: 11
PIF_VALUE: 1
PIF_VALUE: 1
PIF_VALUE: 4
PIF_VALUE: 5
PIF_VALUE: 6
PIF_VALUE: 5
PIF_VALUE: 5
PIF_VALUE: 4
PIF_VALUE: 3
PIF_VALUE: 3
PIF_VALUE: 5
PIF_VALUE: 4
PIF_VALUE: 4
PIF_VALUE: 3
PIF_VALUE: 11
PIF_VALUE: 4
PIF_VALUE: 9
PIF_VALUE: 4
PIF_VALUE: 3
PIF_VALUE: 1
PIF_VALUE: 3
PIF_VALUE: 1
PIF_VALUE: 4
PIF_VALUE: 3
PIF_VALUE: 4
PIF_VALUE: 3
PIF_VALUE: 1

## 2018-08-21 ASSESSMENT — PAIN SCALES - GENERAL
PAINLEVEL_OUTOF10: 9
PAINLEVEL_OUTOF10: 4
PAINLEVEL_OUTOF10: 4
PAINLEVEL_OUTOF10: 8
PAINLEVEL_OUTOF10: 0
PAINLEVEL_OUTOF10: 4
PAINLEVEL_OUTOF10: 0
PAINLEVEL_OUTOF10: 0

## 2018-08-21 ASSESSMENT — PAIN DESCRIPTION - LOCATION: LOCATION: LEG

## 2018-08-21 ASSESSMENT — PAIN - FUNCTIONAL ASSESSMENT: PAIN_FUNCTIONAL_ASSESSMENT: 0-10

## 2018-08-21 ASSESSMENT — PAIN DESCRIPTION - PAIN TYPE: TYPE: SURGICAL PAIN

## 2018-08-21 ASSESSMENT — LIFESTYLE VARIABLES: SMOKING_STATUS: 0

## 2018-08-21 ASSESSMENT — PAIN DESCRIPTION - ORIENTATION: ORIENTATION: LEFT

## 2018-08-21 NOTE — PLAN OF CARE
Problem: Pain:  Goal: Pain level will decrease  Pain level will decrease   Outcome: Ongoing  Patient able to rate pain on a scale of 0-10. Educated on PRN medications and nonpharmalogical methods. Pain levels are under control. Patient calls out appropriately.

## 2018-08-21 NOTE — PROGRESS NOTES
sent to room. Patient transferred via bed in stable condition with all belongings to room 523. Eren Mesa

## 2018-08-21 NOTE — ANESTHESIA PROCEDURE NOTES
Peripheral Block    Patient location during procedure: pre-op  Start time: 8/21/2018 11:51 AM  End time: 8/21/2018 11:58 AM  Staffing  Anesthesiologist: Logan Bucio  Performed: anesthesiologist   Preanesthetic Checklist  Completed: patient identified, site marked, surgical consent, pre-op evaluation, timeout performed, IV checked, risks and benefits discussed, monitors and equipment checked, anesthesia consent given, oxygen available and patient being monitored  Peripheral Block  Patient position: supine  Prep: ChloraPrep  Patient monitoring: cardiac monitor, continuous pulse ox, frequent blood pressure checks and IV access  Block type: Femoral and iPacks  Laterality: left  Injection technique: single-shot  Procedures: ultrasound guided  Local infiltration: lidocaine  Infiltration strength: 1.5 %  Dose: 1 mL  Provider prep: sterile gloves  Local infiltration: lidocaine  Needle  Needle gauge: 21 G  Needle length: 10 cm  Needle localization: anatomical landmarks and ultrasound guidance  Assessment  Injection assessment: negative aspiration for heme  Slow fractionated injection: yes  Hemodynamics: stable  Additional Notes  Left Adductor Canal and IPACK blocks:  Patient ID'd and marked. Sedated with Midazolam and Fentanyl IV. Chlorhex prep to the knee and anteromedial thigh. Lido 1.5% 1 ml SC prior to insertion of the block needle at each site. Bupivacaine 0.25% 20 ml in AC + 30 ml in IPACK space (50 ml total). Patient tolerated well.   Reason for block: post-op pain management

## 2018-08-21 NOTE — ANESTHESIA POSTPROCEDURE EVALUATION
Department of Anesthesiology  Postprocedure Note    Patient: Yumiko Lehman  MRN: [id number]  YOB: 1945  Date of evaluation: 8/21/2018    Procedure Summary     Date:  08/21/18 Room / Location:  [facility]    Anesthesia Start:  1219 Anesthesia Stop:  3568    Procedure:  LEFT TOTAL KNEE MAKOPLASTY WITH PLATELET RICH PLASMA AND ADDUCTOR CANAL BLOCK FOR PAIN CONTROL                   RUI MAKOPLASTY  CPT CODE - 07958 (Left Knee) Diagnosis:       Localized osteoarthritis of left lower leg      (LEFT KNEE OSTEOARTHRITIS)    Surgeon:  Adelaide Porter MD Responsible Provider:  Miguel Obregon MD    Anesthesia Type:  general ASA Status:  2        Anesthesia Type: general    Ryan Phase I: Ryan Score: 9    Ryan Phase II: Ryan Score: 10    Anesthesia Post Evaluation   Anesthetic Problems: no   Last Vitals:    BP: 119/71 Pulse: 59   Resp: 16 SpO2: 99   Temp: 97.6 °F (36.4 °C)     Cardiovascular System Stable: yes  Respiratory Function: Airway Patent yes  ETT no  Ventilator no  Level of consciousness: awake, alert and oriented  Post-op pain: adequate analgesia  Hydration Adequate: yes  Nausea/Vomiting:no  Other Issues:     Sana Ferrari MD

## 2018-08-21 NOTE — OP NOTE
PROCEDURE: The patient was brought to the operating room, and after administration of a general anesthetic and femoral nerve block, was placed  on the OR table in the supine position. The lower extremity was prepped and draped in normal sterile fashion. The limb was exsanguinated. Tourniquet was inflated to 350 mmHg. Informed consent was obtained. Operative site was signed. Time out was called. Patient was given prophylactic antibiotics and DVT prophylaxsis. A straight midline incision was made. Median parapatellar arthrotomy was made. The patella was subluxated laterally. The menisci and ACL were resected. The femoral and tibial tracker pins were placed in a bicortical fashion in the proximal tibial and distal femur. The [employer] robot from [employer] was used to input our patient registration and anatomical survey into the computer. Initial kinematic survey showed the patient to have persistent mild varus tracking starting from 5 degrees of extension to approximately 100 degrees of flexion. Preoperative templated sizes and alignment were confirmed preoperatively using a size 4 femoral component, a size 4 tibial tray and a size 13 insert. Ligament balancing was then performed using our robotic software. Next, the robotic interactive orthopedic arm was brought into the field. It  was registered. Our tibial cut was first made. Next, our distal femoral cut,  our anterior and posterior chamfer cuts and anterior and posterior cuts were  then made. Our femoral trial and tibial trial were the placed along with an insert. The patella was resurfaced using a symmetric patella component using our reamer system for our  patella. Trowels were then placed. The knee was put through a full range of  motion noting full extension and to at least 130 degrees of knee flexion with  balance flexion and extension gap and neutral alignment to approximately 2-3  degrees of varus throughout the entire range of motion. Ligament balancing was then performed noting excellent ligament balance both medial and laterally. The patella  tracked centrally with out lateral retinacular release. Next, the posterior osteophytes and meniscal remnants were resected. The pain cocktail was injected into the posterior capsule of the medial and lateral gutters. Final range of motion: 0 extension to 130 degrees of flexion. Next all trials were removed. The femoral and tibia array pins were removed. The cut bony surfaces were irrigated with copious amounts of irrigation solution. At the appropriate cement consistency the tibial, femoral and patellar components were cemented in place. All excess cement was removed. The insert was then placed. The knee was held in extension as the cement cured. The knee was irrigated with 5 liters of irrigation solution using pulsatile lavage. The tourniquet was let down before closure. Any bleeding vessels were coagulated using the Bovie cautery. The knee was then closed in layers using #2 Ethibond to close the extensor mechanism, a combination of 0- and 2-0 Monocryl to close the subcutaneous tissue and 4-0 Monocryl to close the skin. Dermaflex was applied to seal the wound and allowed to dry before a dry sterile compression dressing was applied. The patient was then taken to recovery in stable condition having tolerated the procedure well.     Clark Sweet M.D.

## 2018-08-22 VITALS
SYSTOLIC BLOOD PRESSURE: 128 MMHG | HEIGHT: 62 IN | BODY MASS INDEX: 26.01 KG/M2 | RESPIRATION RATE: 16 BRPM | DIASTOLIC BLOOD PRESSURE: 68 MMHG | OXYGEN SATURATION: 100 % | TEMPERATURE: 98 F | WEIGHT: 141.31 LBS | HEART RATE: 64 BPM

## 2018-08-22 LAB
ANION GAP SERPL CALCULATED.3IONS-SCNC: 8 MMOL/L (ref 3–16)
BASOPHILS ABSOLUTE: 0 K/UL (ref 0–0.2)
BASOPHILS RELATIVE PERCENT: 0.1 %
BUN BLDV-MCNC: 16 MG/DL (ref 7–20)
CALCIUM SERPL-MCNC: 8.6 MG/DL (ref 8.3–10.6)
CHLORIDE BLD-SCNC: 105 MMOL/L (ref 99–110)
CO2: 25 MMOL/L (ref 21–32)
CREAT SERPL-MCNC: 0.6 MG/DL (ref 0.6–1.2)
EOSINOPHILS ABSOLUTE: 0 K/UL (ref 0–0.6)
EOSINOPHILS RELATIVE PERCENT: 0.2 %
GFR AFRICAN AMERICAN: >60
GFR NON-AFRICAN AMERICAN: >60
GLUCOSE BLD-MCNC: 102 MG/DL (ref 70–99)
GLUCOSE BLD-MCNC: 109 MG/DL (ref 70–99)
GLUCOSE BLD-MCNC: 109 MG/DL (ref 70–99)
GLUCOSE BLD-MCNC: 112 MG/DL (ref 70–99)
HCT VFR BLD CALC: 28.5 % (ref 36–48)
HEMOGLOBIN: 10 G/DL (ref 12–16)
LYMPHOCYTES ABSOLUTE: 1 K/UL (ref 1–5.1)
LYMPHOCYTES RELATIVE PERCENT: 11.8 %
MCH RBC QN AUTO: 32.4 PG (ref 26–34)
MCHC RBC AUTO-ENTMCNC: 35.1 G/DL (ref 31–36)
MCV RBC AUTO: 92.2 FL (ref 80–100)
MONOCYTES ABSOLUTE: 0.6 K/UL (ref 0–1.3)
MONOCYTES RELATIVE PERCENT: 7 %
NEUTROPHILS ABSOLUTE: 6.6 K/UL (ref 1.7–7.7)
NEUTROPHILS RELATIVE PERCENT: 80.9 %
PDW BLD-RTO: 14.7 % (ref 12.4–15.4)
PERFORMED ON: ABNORMAL
PLATELET # BLD: 143 K/UL (ref 135–450)
PMV BLD AUTO: 9.7 FL (ref 5–10.5)
POTASSIUM REFLEX MAGNESIUM: 4.2 MMOL/L (ref 3.5–5.1)
RBC # BLD: 3.09 M/UL (ref 4–5.2)
SODIUM BLD-SCNC: 138 MMOL/L (ref 136–145)
WBC # BLD: 8.2 K/UL (ref 4–11)

## 2018-08-22 PROCEDURE — 94761 N-INVAS EAR/PLS OXIMETRY MLT: CPT

## 2018-08-22 PROCEDURE — 97530 THERAPEUTIC ACTIVITIES: CPT

## 2018-08-22 PROCEDURE — 2580000003 HC RX 258: Performed by: PHYSICIAN ASSISTANT

## 2018-08-22 PROCEDURE — 80048 BASIC METABOLIC PNL TOTAL CA: CPT

## 2018-08-22 PROCEDURE — 6360000002 HC RX W HCPCS: Performed by: PHYSICIAN ASSISTANT

## 2018-08-22 PROCEDURE — 85025 COMPLETE CBC W/AUTO DIFF WBC: CPT

## 2018-08-22 PROCEDURE — G8979 MOBILITY GOAL STATUS: HCPCS

## 2018-08-22 PROCEDURE — 97166 OT EVAL MOD COMPLEX 45 MIN: CPT

## 2018-08-22 PROCEDURE — G8978 MOBILITY CURRENT STATUS: HCPCS

## 2018-08-22 PROCEDURE — 36415 COLL VENOUS BLD VENIPUNCTURE: CPT

## 2018-08-22 PROCEDURE — 6370000000 HC RX 637 (ALT 250 FOR IP): Performed by: PHYSICIAN ASSISTANT

## 2018-08-22 PROCEDURE — 97161 PT EVAL LOW COMPLEX 20 MIN: CPT

## 2018-08-22 PROCEDURE — 97116 GAIT TRAINING THERAPY: CPT

## 2018-08-22 PROCEDURE — G8987 SELF CARE CURRENT STATUS: HCPCS

## 2018-08-22 PROCEDURE — G8980 MOBILITY D/C STATUS: HCPCS

## 2018-08-22 PROCEDURE — 97110 THERAPEUTIC EXERCISES: CPT

## 2018-08-22 PROCEDURE — G8988 SELF CARE GOAL STATUS: HCPCS

## 2018-08-22 RX ORDER — ASPIRIN 325 MG
325 TABLET, DELAYED RELEASE (ENTERIC COATED) ORAL 2 TIMES DAILY
Qty: 60 TABLET | Refills: 0 | Status: SHIPPED | OUTPATIENT
Start: 2018-08-22 | End: 2021-06-17

## 2018-08-22 RX ORDER — OXYCODONE HYDROCHLORIDE AND ACETAMINOPHEN 5; 325 MG/1; MG/1
1 TABLET ORAL EVERY 6 HOURS PRN
Qty: 28 TABLET | Refills: 0 | Status: SHIPPED | OUTPATIENT
Start: 2018-08-22 | End: 2018-09-04 | Stop reason: SDUPTHER

## 2018-08-22 RX ADMIN — FAMOTIDINE 20 MG: 20 TABLET ORAL at 10:10

## 2018-08-22 RX ADMIN — Medication 2 G: at 01:37

## 2018-08-22 RX ADMIN — OXYCODONE HYDROCHLORIDE AND ACETAMINOPHEN 1 TABLET: 5; 325 TABLET ORAL at 05:53

## 2018-08-22 RX ADMIN — SODIUM CHLORIDE, PRESERVATIVE FREE 10 ML: 5 INJECTION INTRAVENOUS at 10:11

## 2018-08-22 RX ADMIN — DOCUSATE SODIUM AND SENNOSIDES 1 TABLET: 8.6; 5 TABLET, FILM COATED ORAL at 10:10

## 2018-08-22 RX ADMIN — ENOXAPARIN SODIUM 30 MG: 30 INJECTION SUBCUTANEOUS at 10:10

## 2018-08-22 RX ADMIN — OXYCODONE HYDROCHLORIDE AND ACETAMINOPHEN 2 TABLET: 5; 325 TABLET ORAL at 16:33

## 2018-08-22 RX ADMIN — KETOROLAC TROMETHAMINE 15 MG: 30 INJECTION, SOLUTION INTRAMUSCULAR at 11:42

## 2018-08-22 RX ADMIN — VITAMIN D, TAB 1000IU (100/BT) 1000 UNITS: 25 TAB at 10:10

## 2018-08-22 RX ADMIN — Medication 100 MG: at 11:42

## 2018-08-22 ASSESSMENT — PAIN SCALES - GENERAL
PAINLEVEL_OUTOF10: 6
PAINLEVEL_OUTOF10: 5
PAINLEVEL_OUTOF10: 8
PAINLEVEL_OUTOF10: 8

## 2018-08-22 ASSESSMENT — PAIN DESCRIPTION - LOCATION: LOCATION: KNEE

## 2018-08-22 ASSESSMENT — PAIN DESCRIPTION - ORIENTATION: ORIENTATION: LEFT

## 2018-08-22 NOTE — CARE COORDINATION
Appointments  Date Time Provider Ricarda Elli   9/4/2018 2:20 PM MAXIMINO Kwan AND CHERYL Hernandez BSN  Care Transition Coordinator for ortho bundle  [phone]

## 2018-08-22 NOTE — PLAN OF CARE
Problem: Musculor/Skeletal Functional Status  Intervention: OT Evaluation/treatment  Pt will participate in OT to increase transfers and ADLs.

## 2018-08-22 NOTE — PROGRESS NOTES
Department of Orthopedic Surgery  Physician Assistant   Progress Note    Subjective:     Post-Operative Day: 1 Status Post left Total Knee Arthroplasty  Systemic or Specific Complaints:No Complaints today. She states her knee feels like \"heaven\". Able to work with therapy and navigate steps this am.     Objective:     Patient Vitals for the past 24 hrs:   BP Temp Temp src Pulse Resp SpO2   08/22/18 0954 - - - - 19 96 %   08/22/18 0735 113/63 97.6 °F (36.4 °C) Oral 61 16 99 %   08/22/18 0342 (!) 105/57 98.4 °F (36.9 °C) Oral 64 16 96 %   08/21/18 2242 (!) 111/56 97.5 °F (36.4 °C) Oral 56 16 98 %   08/21/18 1945 138/70 97.8 °F (36.6 °C) Oral 54 16 100 %   08/21/18 1818 119/71 97.6 °F (36.4 °C) Oral 59 16 99 %   08/21/18 1710 114/77 97.6 °F (36.4 °C) Oral 63 16 99 %   08/21/18 1627 125/77 97.6 °F (36.4 °C) Oral 62 14 99 %   08/21/18 1545 (!) 121/58 - - 61 14 100 %   08/21/18 1530 (!) 133/56 - - 63 24 99 %   08/21/18 1515 135/63 - - 63 19 98 %   08/21/18 1445 123/64 - - 71 12 96 %   08/21/18 1440 124/61 97 °F (36.1 °C) Temporal 64 14 93 %   08/21/18 1430 114/60 - - 64 13 94 %   08/21/18 1425 (!) 106/51 - - 60 11 93 %   08/21/18 1418 - 96.9 °F (36.1 °C) Temporal - - -       General: alert, appears stated age, cooperative and no distress   Wound: Wound clean and dry no evidence of infection. Motion: Painful range of Motion   DVT Exam: No evidence of DVT seen on physical exam.       Knee swollen but thigh soft to palpation. Moving foot and ankle. Good distal pulses. Data Review  CBC: Lab Results   Component Value Date    WBC 8.2 08/22/2018    RBC 3.09 08/22/2018    HGB 10.0 08/22/2018    HCT 28.5 08/22/2018     08/22/2018       Assessment:     Status Post left Total Knee Arthroplasty. Doing well postoperatively. Plan:      1: Continues current post-op course, IM signed off. Labs stable post op  2:  Continue Deep venous thrombosis prophylaxis- lovenox. Plan for ASA per OV.    3:  Continue physical therapy and OT, WBAT  4:  Continue Pain Control PRN  5:  D/c planning for home today, pt has walker and outpatient therapy arranged. She recently had right TKR in march 2018 and did well after this surgery. DCP updated  6:  Bundle Program Patient  7:  Prescriptions have been filled through the Meds to [facility]. 8:  Patient being given increased dosage/quantity of opoid pain medication in excess of OSMB guidelines which noted a 30 MED daily of opioids due to the fact that he/she has undergone major orthopaedic surgery as outlined in rule 4731-11-13. Dosages and further duration of the pain medication will be re-evaluated at her post op visit in 2 weeks. Patient was educated on dosing expectations and limits of prescribing as a result of the new State mental health facility Board rules enacted August 31, 2017. Please also note that this is not the initial opoid prescription issued to this patient but a continuation of medication utilized during the hospital admission as noted in the medical record. OARRS report has also been utilized to screen for any abuse history or suspicious activity as outlined in [place]. All efforts have been taken to prevent abuse potential and misuse of opioid medications including education, screening, and close clinical follow up.     Gay Lombard PA-C

## 2018-08-22 NOTE — CONSULTS
IM/ hospitalist consulted for Medical mgt. EMR and notes reviewed. No acute medical issues, home meds have been reordered. Im will sign off, thank you for consult. Please call if needs arise, Discussed with nursing.

## 2018-08-22 NOTE — DISCHARGE SUMMARY
Department of Orthopedic Surgery  Physician Assistant   Discharge Summary    The Areli Wang is a 68 y.o. female underwent total knee replacement procedure without complication. Areli Wang was admitted to the floor following Her recovery in the PACU.      Discharge Diagnosis  left Knee Replacement    Current Inpatient Medications    Current Facility-Administered Medications: cyclobenzaprine (FLEXERIL) tablet 10 mg, 10 mg, Oral, TID PRN  lactated ringers infusion, , Intravenous, Continuous  lactated ringers infusion, , Intravenous, Continuous  bupivacaine liposome (EXPAREL) 1.3 % injection 266 mg, 20 mL, Subcutaneous, Once  FLUoxetine (PROZAC) capsule 40 mg, 40 mg, Oral, Nightly  vitamin B-1 (THIAMINE) tablet 100 mg, 100 mg, Oral, Daily  vitamin D (CHOLECALCIFEROL) tablet 1,000 Units, 1,000 Units, Oral, Daily  lactated ringers infusion, , Intravenous, Continuous  sodium chloride flush 0.9 % injection 10 mL, 10 mL, Intravenous, 2 times per day  sodium chloride flush 0.9 % injection 10 mL, 10 mL, Intravenous, PRN  acetaminophen (TYLENOL) tablet 650 mg, 650 mg, Oral, Q4H PRN  oxyCODONE-acetaminophen (PERCOCET) 5-325 MG per tablet 1 tablet, 1 tablet, Oral, Q4H PRN **OR** oxyCODONE-acetaminophen (PERCOCET) 5-325 MG per tablet 2 tablet, 2 tablet, Oral, Q4H PRN  morphine injection 2 mg, 2 mg, Intravenous, Q2H PRN **OR** morphine (PF) injection 4 mg, 4 mg, Intravenous, Q2H PRN  magnesium hydroxide (MILK OF MAGNESIA) 400 MG/5ML suspension 30 mL, 30 mL, Oral, Daily PRN  ondansetron (ZOFRAN) injection 4 mg, 4 mg, Intravenous, Q6H PRN  enoxaparin (LOVENOX) injection 30 mg, 30 mg, Subcutaneous, BID  glucose (GLUTOSE) 40 % oral gel 15 g, 15 g, Oral, PRN  dextrose 50 % solution 12.5 g, 12.5 g, Intravenous, PRN  glucagon (rDNA) injection 1 mg, 1 mg, Intramuscular, PRN  dextrose 5 % solution, 100 mL/hr, Intravenous, PRN  ketorolac (TORADOL) injection 15 mg, 15 mg, Intravenous, Q6H  famotidine (PEPCID) tablet 20 mg, 20 mg, Oral, Daily  insulin lispro (HUMALOG) injection vial 0-6 Units, 0-6 Units, Subcutaneous, TID WC  insulin lispro (HUMALOG) injection vial 0-3 Units, 0-3 Units, Subcutaneous, Nightly  sennosides-docusate sodium (SENOKOT-S) 8.6-50 MG tablet 1 tablet, 1 tablet, Oral, Daily    Post-operatively the patients diet was advanced as tolerated and their incision was checked on POD #1. The incision is dressing in place, clean, dry and intact with no signs of infection. The patient remained neurovascularly intact in the lower extremity and had intact pulses distally. Patients calf remained soft and showed no evidence of DVT. The patient was able to move their leg and ankle/foot without any problems post-operatively. Physical therapy and occupational therapy were consulted and began working with the patient post-operatively. The patient progressed with PT/OT as would be expected and continued to improve through their stay. The patients pain was initially controlled with IV medications but we were able to transition to oral pain medications soon after arrival to the floor and their pain remained under good control through their hospital stay. From a medical standpoint the patient remained stable and continued to have the medicine team follow throughout their stay. The patients dressing was changed/incison was checked on day of d/c. The patient will be discharged at this time to Home  with their current diet restrictions and will continue to follow the total knee precautions outlined to them by us and PT/OT. Acute blood loss anemia, expected, no indication for transfusion    Condition on Discharge: Stable    Plan  Return visit in 2 weeks. .  Patient was instructed on the use of pain medications, the signs and symptoms of infection, and was given our number to call should they have any questions or concerns following discharge.

## 2018-08-22 NOTE — PROGRESS NOTES
ft  Stairs/Curb  Stairs?: Yes  Stairs  # Steps : 6  Stairs Height: 6\"  Rails: Bilateral  Device: No Device  Assistance: Supervision     Balance  Posture: Good  Sitting - Static: Good  Sitting - Dynamic: Good  Standing - Static: Good  Standing - Dynamic: Good;-  Exercises  Straight Leg Raise: x 10 LLE  Quad Sets: x 10 B  Heelslides: x 10 LLE  Gluteal Sets: x 10  Knee Short Arc Quad: x 10 LLE  Knee Passive Range of Motion: 0 to 103  Ankle Pumps: x 10 B     Assessment   Body structures, Functions, Activity limitations: Decreased functional mobility ; Decreased ROM; Decreased strength  Assessment: Pt referred for PT evaluation during current hospital stay wit a diagnosis of lef tknee OA, POD #1 TKA. Pt currently functioning below baseline only slightly, requiring supervision for transfers and gait using RW with partial step through pattern. Recommend home with 24 hr sup/assist and OPPT when appropriate. Treatment Diagnosis: decreased mobility  Prognosis: Excellent  Decision Making: Low Complexity  Patient Education: role of PT, ther ex, gait with RW  Barriers to Learning: no  REQUIRES PT FOLLOW UP: Yes  Activity Tolerance  Activity Tolerance: Patient Tolerated treatment well         Plan   Plan  Times per week: BID x7  Times per day: Twice a day  Current Treatment Recommendations: Strengthening, Gait Training, ROM, Stair training, Balance Training, Functional Mobility Training, Endurance Training, Transfer Training, Home Exercise Program  Safety Devices  Type of devices: All fall risk precautions in place, Left in chair, Call light within reach, Chair alarm in place, Nurse notified, Gait belt, Patient at risk for falls    G-Code  PT G-Codes  Functional Assessment Tool Used: AMPAC  Score: 22  Functional Limitation: Mobility: Walking and moving around  Mobility: Walking and Moving Around Current Status ():  At least 20 percent but less than 40 percent impaired, limited or restricted  Mobility: Walking and Moving

## 2018-08-22 NOTE — PROGRESS NOTES
arrival, agreeable to OT eval and treat  General Comment  Comments: RN cleared pt to participate in OT eval  Pain Assessment  Patient Currently in Pain: Denies (pt reporting \"It aches but I wouldn't call it pain\")  Pain Assessment: 0-10  Pain Level: 5  Pain Type: Surgical pain  Pain Location: Knee  Pain Orientation: Left  Pain Intervention(s): Ambulation/Increased activity, Repositioned, Therapeutic presence  Response to Pain Intervention: Asleep with RR greater than 10  Oxygen Therapy  SpO2: 96 %  Pulse Oximeter Device Mode: Intermittent  Pulse Oximeter Device Location: Finger  O2 Device: None (Room air)  Blood Gas  Performed?: No  Social/Functional History  Social/Functional History  Lives With: Spouse  Type of Home: House  Home Layout: One level  Home Access: Stairs to enter with rails  Entrance Stairs - Number of Steps: 2  Bathroom Shower/Tub: Walk-in shower  Bathroom Toilet: Standard  Bathroom Equipment: Built-in shower seat, Toilet raiser (with vanity nearby for leverage)  Bathroom Accessibility: Walker accessible  Home Equipment: Rolling walker, Cane  ADL Assistance: Independent  Homemaking Assistance: Independent  Homemaking Responsibilities: Yes  Ambulation Assistance: Independent  Transfer Assistance: Independent  Active : Yes  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[employer])  Additional Comments: pt reports no falls, enjoys square dancing       Objective        Orientation  Overall Orientation Status: Within Functional Limits     Balance  Sitting Balance: Supervision  Standing Balance: Supervision (to mod I)  Standing Balance  Time: 15 mins total  Activity: funcitonal mobility in room, in hallway, ascending/ descending stairs  Sit to stand: Supervision (to mod I)  Stand to sit: Supervision  Comment: with RW  Functional Mobility  Functional - Mobility Device: Rolling Walker  Activity: To/from bathroom; Other (in hallway)  Assist Level: Supervision (to mod howard. PT demonstrates no HHOT needs at this time, reports planning to follow up with outpatient PT  Treatment Diagnosis: decreased ADLs and transfers secondary to L knee OA  Prognosis: Good  Decision Making: Medium Complexity  Patient Education: role of OT- verb understanding  Barriers to Learning: none  REQUIRES OT FOLLOW UP: Yes  Activity Tolerance  Activity Tolerance: Patient Tolerated treatment well  Activity Tolerance: pt reported no fatigue upon return to bedside chair  Safety Devices  Safety Devices in place: Yes  Type of devices: Left in chair;Nurse notified;Call light within reach; Chair alarm in place         Plan   Plan  Times per week: 3-5  Times per day: Daily  Current Treatment Recommendations: Balance Training, Functional Mobility Training, Endurance Training, Self-Care / ADL, Gait Training    G-Code  OT G-codes  Functional Assessment Tool Used: OT ADL [facility]  Score: 22  Functional Limitation: Self care  Self Care Current Status (): At least 20 percent but less than 40 percent impaired, limited or restricted  Self Care Goal Status ():  At least 1 percent but less than 20 percent impaired, limited or restricted           AM-PAC Score        AM-[facility] Inpatient Daily Activity Raw Score: 22  AM-PAC Inpatient ADL T-Scale Score : 47.1  ADL Inpatient CMS 0-100% Score: 25.8  ADL Inpatient CMS G-Code Modifier : CJ    Goals  Short term goals  Time Frame for Short term goals: by 1 week unless otherwise specified  Short term goal 1: pt will complete toileting with mod I  Short term goal 2: pt will complete lb dressing with mod i by 8/29/18  Short term goal 3: pt will complete standing level grooming tasks x 10 mins with mod I  Patient Goals   Patient goals : to go home       Therapy Time   Individual Concurrent Group Co-treatment   Time In 0841         Time Out 0910         Minutes 29         Timed Code Treatment Minutes: 19 Minutes (10 min eval)    If patient discharges prior to next treatment, this note will

## 2018-08-22 NOTE — PROGRESS NOTES
Prescriptions and discharge instructions given. Pt verbalized understanding denies any questions/ needs at this time. IV removed with no complications. Took patient out to front lobby.

## 2018-08-23 ENCOUNTER — CARE COORDINATION (OUTPATIENT)
Dept: CASE MANAGEMENT | Age: 73
End: 2018-08-23

## 2018-08-23 NOTE — CARE COORDINATION
Spoke with patient. States she feels lousy. States she feels weak and tired. States she did not sleep well. States she has not eaten today. States that she has not done anything today. States wifi is not working, so has not submitted Ortho vitals. Discussed with her the need to eat. Discussed need for putting on brittanie hose and getting up and doing exercises. States that she is having a fair amount of pain. Incision status: States dressing is dry and intact. Edema/Swelling: States swelling present. Continues to ice and has not applied brittanie hose yet today. Pain level and status: States pain is more today. Use of pain medications: States she is taking pain meds every 6 hours. Discussed meloxicam and she states she has it at the pharmacy and will have someone pick it up for her. Bowels: States bowels have not moved. States she took a laxative and nothing happening today. Instructed on eating and drinking and discussed stool softeners. Outpatient therapy: Patient has still not set up appt for outpatient. Patient has number and states she will set it up today. Do you have all of your medications: Yes. Taking aspirin 325mg twice a day.     Changes in medications: No    Follow up appointments:    Future Appointments  Date Time Provider Ricarda Calloway   9/4/2018 2:20 PM MAXIMINO Fisher AND CHERYL Smith BSN  Care Transition Coordinator for ortho bundle  [phone]

## 2018-08-24 ENCOUNTER — HOSPITAL ENCOUNTER (OUTPATIENT)
Dept: PHYSICAL THERAPY | Age: 73
Setting detail: THERAPIES SERIES
Discharge: HOME | End: 2018-08-24
Payer: MEDICARE

## 2018-08-24 ENCOUNTER — CARE COORDINATION (OUTPATIENT)
Dept: CASE MANAGEMENT | Age: 73
End: 2018-08-24

## 2018-08-24 DIAGNOSIS — M17.12 PRIMARY OSTEOARTHRITIS OF LEFT KNEE: ICD-10-CM

## 2018-08-24 DIAGNOSIS — Z96.652 STATUS POST TOTAL LEFT KNEE REPLACEMENT: Primary | ICD-10-CM

## 2018-08-24 PROCEDURE — 97016 VASOPNEUMATIC DEVICE THERAPY: CPT | Performed by: PHYSICAL THERAPIST

## 2018-08-24 PROCEDURE — 97161 PT EVAL LOW COMPLEX 20 MIN: CPT | Performed by: PHYSICAL THERAPIST

## 2018-08-24 PROCEDURE — 97110 THERAPEUTIC EXERCISES: CPT | Performed by: PHYSICAL THERAPIST

## 2018-08-24 PROCEDURE — G8978 MOBILITY CURRENT STATUS: HCPCS | Performed by: PHYSICAL THERAPIST

## 2018-08-24 PROCEDURE — G8979 MOBILITY GOAL STATUS: HCPCS | Performed by: PHYSICAL THERAPIST

## 2018-08-24 NOTE — CARE COORDINATION
Spoke with patient. States she is feeling much better today. Incision status: States dressing is dry and intact with no drainage. Edema/Swelling: States swelling improving. Ananth hose on bilat. Pain level and status: States pain is much better today. Use of pain medications: Continues to take pain meds with relief. Bowels: States bowels have not moved. Taking stool softeners and states she is passing \"gas\". Outpatient therapy: Starting today. Do you have all of your medications: Yes    Changes in medications: No  Has not tried Ortho vitals yet, because wifi is not working. Discussed remedies. Patient is going to try.     Follow up appointments:    Future Appointments  Date Time Provider Ricarda Calloway   8/24/2018 1:00 [place] [id number] [facility] AND PT None   9/4/2018 2:20 PM MAXIMINO Mirza AND CHERYL Lombardo BSN  Care Transition Coordinator for ortho bundle  [phone]

## 2018-08-24 NOTE — FLOWSHEET NOTE
[place] [address], [address]  Phone: [phone]  Fax [phone]    Physical Therapy Daily Treatment Note  Date:  2018    Patient Name:  Cinda Lesch    :  1945  MRN: [id number]  Restrictions/Precautions:    Medical/Treatment Diagnosis Information:  Diagnosis: M17.12 (ICD-10-CM) - Primary localized osteoarthritis of left knee, s/p Total knee arthroscopy on 2018  Treatment Diagnosis: left knee pain M25.562, left knee effusion M25.462, left leg muscle weakness M62.82, gait abnormality 64.9  Insurance/Certification information:  [facility]  Physician Information:  Dr. Keily Knott of care signed (Y/N):     Date of Patient follow up with Physician: 18    G-Code (if applicable):      Date G-Code Applied:    PT G-Codes  Functional Assessment Tool Used: LEFS  Score: 65%  Functional Limitation: Mobility: Walking and moving around  Mobility: Walking and Moving Around Current Status (): At least 60 percent but less than 80 percent impaired, limited or restricted  Mobility: Walking and Moving Around Goal Status ():  At least 20 percent but less than 40 percent impaired, limited or restricted    Progress Note: [x]  Yes  []  No  Next due by: Visit #10       Latex Allergy:  [x]NO      []YES  Preferred Language for Healthcare:   [x]English       []other:    Visit # Insurance Allowable Requires auth   2 MC    [x]no        []yes:       Pain level:  2-8/10     SUBJECTIVE:  See eval    OBJECTIVE: See eval  Observation:   Test measurements:      RESTRICTIONS/PRECAUTIONS: none noted    Exercises/Interventions:     Therapeutic Ex Sets/sec Notes   gastroc stretches with strap 5x30 sec hep   Quad sets 10 sec 10x hep   heelslides for flexion 10 sec 10x hep   slr                                   Manual Intervention                                   NMR re-education                                       Therapeutic eval    Treatment/Activity Tolerance:  [x] Patient tolerated treatment well [] Patient limited by fatique  [] Patient limited by pain  [] Patient limited by other medical complications  [] Other:     Prognosis: [x] Good [] Fair  [] Poor    Patient Requires Follow-up: [x] Yes  [] No    PLAN: Cont PT 2x/week for up to 12 weeks to progress functional gait, strength, and ROM  [] Continue per plan of care [] Alter current plan (see comments)  [x] Plan of care initiated [] Hold pending MD visit [] Discharge    Electronically signed by: Zonia Apley, PT

## 2018-08-24 NOTE — PLAN OF CARE
[facility], [address]  Phone: [phone]  Fax [phone]     Physical Therapy Certification    Dear  Gypsy Motta,    We had the pleasure of evaluating the following patient for physical therapy services at [address] [place] [address]. A summary of our findings can be found in the initial assessment below. This includes our plan of care. If you have any questions or concerns regarding these findings, please do not hesitate to contact me at the office phone number checked above. Thank you for the referral.       Physician Signature:_______________________________Date:__________________  By signing above (or electronic signature), therapists plan is approved by physician    Patient: Daphne Galo   : 1945   MRN: [id number]  Referring Physician: Dr. Gypsy Motta      Evaluation Date: 2018      Medical Diagnosis Information:  M17.12 (ICD-10-CM) - Primary osteoarthritis of left knee, S19.581 (ICD-10-CM) - Status post total left knee replacement   Treatment Diagnosis: left knee pain M25.562, left knee effusion M25.462, left leg muscle weakness M62.82, gait abnormality 26.9                                         Insurance information: [facility]     Precautions/ Contra-indications: unremarkable  Latex Allergy:  [x]NO      []YES  Preferred Language for Healthcare:   [x]English       []other:    SUBJECTIVE: Patient stated complaint:she had left knee arthritis and needed TKR. She had surgery 3 days ago, 18. She reports she has not been able to do Orthovitals because of her wifi connection. She reports she had her right knee done in 2018. She reports this knee has been more sore than the right knee. She reports she uses a walker on occasion since surgery, and presents to PT without it today. She reports she notices increased swelling since this surgery.   She returns for follow up to MD next week 8/27/18. She reports that she has not yet had a bowel movement for about 6 days. She is able to pass some gas, but no real bowel movement. Relevant Medical History:right TKR 3/2018  Functional Disability Index: LEFS 65%    Pain Scale: 2-8/10  Easing factors:meds, ice, change position  Provocative factors: weight bearing, walking, straightening leg    Type: [x]Constant   []Intermittent  []Radiating []Localized []other:     Numbness/Tingling: denies    Occupation/School:n/a    Living Status/Prior Level of Function: Independent with ADLs and IADLs, square dancing    OBJECTIVE:     ROM LEFT RIGHT   HIP Flex     HIP Abd     HIP Ext     HIP IR     HIP ER     Knee ext 0 deg resting heelprop 0 deg resting   Knee Flex 80 deg by passive heelslide 100 deg by heelslide   Ankle PF     Ankle DF     Ankle In     Ankle Ev     Strength  LEFT RIGHT   HIP Flexors     HIP Abductors     HIP Ext     Hip ER     Knee EXT (quad) Quad set poor Quad set good   Knee Flex (HS)     Ankle DF     Ankle PF     Ankle Inv     Ankle EV          Circumference  Mid pat  6 inches inferior to midpat     47.0cm  39.3 cm   42.0cm  34.8cm     Reflexes/Sensation:deferred   []Dermatomes/Myotomes intact    []Reflexes equal and normal bilaterally   []Other:    Joint mobility:    []Normal    [x]Hypo: consistent with recent surgery   []Hyper    Palpation: global tenderness to palpation consistent with recent surgery    Functional Mobility/Transfers: independent    Posture: moderate global swelling entire left LE    Bandages/Dressings/Incisions: unable to see due to tight pants    Gait: (include devices/WB status) pt demonstrates antalgic gait without assistive device on level surface    Orthopedic Special Tests: negative Homans. Does have tenderness in calf globally, but no Wilian's sign                       [x] Patient history, allergies, meds reviewed. Medical chart reviewed. See intake form.      Review Of Systems (ROS):  [x]Performed Review of systems (Integumentary, CardioPulmonary, Neurological) by intake and observation. Intake form has been scanned into medical record. Patient has been instructed to contact their primary care physician regarding ROS issues if not already being addressed at this time. Co-morbidities/Complexities (which will affect course of rehabilitation):  []None           Arthritic conditions   []Rheumatoid arthritis (M05.9)  []Osteoarthritis (M19.91)   Cardiovascular conditions   []Hypertension (I10)  []Hyperlipidemia (E78.5)  []Angina pectoris (I20)  []Atherosclerosis (I70)   Musculoskeletal conditions   []Disc pathology   []Congenital spine pathologies   []Prior surgical intervention  []Osteoporosis (M81.8)  []Osteopenia (M85.8)   Endocrine conditions   []Hypothyroid (E03.9)  []Hyperthyroid Gastrointestinal conditions   []Constipation (O64.65)   Metabolic conditions   []Morbid obesity (E66.01)  []Diabetes type 1(E10.65) or 2 (E11.65)   []Neuropathy (G60.9)     Pulmonary conditions   []Asthma (J45)  []Coughing   []COPD (J44.9)   Psychological Disorders  []Anxiety (F41.9)  []Depression (F32.9)   []Other:   []Other:          Barriers to/and or personal factors that will affect rehab potential:              [x]Age  []Sex              []Motivation/Lack of Motivation                        []Co-Morbidities              []Cognitive Function, education/learning barriers              []Environmental, home barriers              []profession/work barriers  [x]past PT/medical experience  []other:  Justification: patient voiced concern that she is more sore and swollen than when she had her right knee replaced. Falls Risk Assessment (30 days):   [x] Falls Risk assessed and no intervention required.   [] Falls Risk assessed and Patient requires intervention due to being higher risk   TUG score (>12s at risk):     [] Falls education provided, including       G-Codes:  PT G-Codes  Functional Assessment Tool Used: LEFS  Score: OA/hip OA   []Signs/symptoms consistent with internal derangement of knee/Hip   []Signs/symptoms consistent with functional hip weakness/NMR control      []Signs/symptoms consistent with tendinitis/tendinosis    []signs/symptoms consistent with pathology which may benefit from Dry needling      []other:      Prognosis/Rehab Potential:      []Excellent   [x]Good    []Fair   []Poor    Tolerance of evaluation/treatment:    []Excellent   [x]Good    []Fair   []Poor  Physical Therapy Evaluation Complexity Justification  [x] A history of present problem with:  [x] no personal factors and/or comorbidities that impact the plan of care;  []1-2 personal factors and/or comorbidities that impact the plan of care  []3 personal factors and/or comorbidities that impact the plan of care  [x] An examination of body systems using standardized tests and measures addressing any of the following: body structures and functions (impairments), activity limitations, and/or participation restrictions;:  [] a total of 1-2 or more elements   [] a total of 3 or more elements   [x] a total of 4 or more elements   [x] A clinical presentation with:  [x] stable and/or uncomplicated characteristics   [] evolving clinical presentation with changing characteristics  [] unstable and unpredictable characteristics;   [x] Clinical decision making of [x] low, [] moderate, [] high complexity using standardized patient assessment instrument and/or measurable assessment of functional outcome.     [x] EVAL (LOW) 72927 (typically 20 minutes face-to-face)  [] EVAL (MOD) 12815 (typically 30 minutes face-to-face)  [] EVAL (HIGH) 67838 (typically 45 minutes face-to-face)  [] RE-EVAL       PLAN:   Frequency/Duration: 2-3 days per week for up to 12 Weeks:  Interventions:  [x]  Therapeutic exercise including: strength training, ROM, for Lower extremity and core   [x]  NMR activation and proprioception for LE, Glutes and Core   [x]  Manual therapy as indicated for LE, Hip and spine to include: Dry Needling/IASTM, STM, PROM, Gr I-IV mobilizations, manipulation. [x] Modalities as needed that may include: thermal agents, E-stim, Biofeedback, US, iontophoresis as indicated  [x] Patient education on joint protection, postural re-education, activity modification, progression of HEP. HEP instruction: (see scanned forms)    GOALS:  Patient stated goal: return to normal motion    Therapist goals for Patient:   Short Term Goals: To be achieved in: 2 weeks  1. Independent in HEP and progression per patient tolerance, in order to prevent re-injury. 2. Patient will have a decrease in pain to facilitate improvement in movement, function, and ADLs as indicated by Functional Deficits. Long Term Goals: To be achieved in: 12 weeks  1. Disability index score of 28% or less for the LEFS to assist with reaching prior level of function. 2. Patient will demonstrate increased PROM to 0-120 deg to allow for proper joint functioning as indicated by patients Functional Deficits. 3. Patient will demonstrate an increase in Strength to good proximal hip strength and control, within 5lb HHD in LE to allow for proper functional mobility as indicated by patients Functional Deficits. 4. Patient will return to ADL functional activities without increased symptoms or restriction.    5. Pt will return to normal ambulation without assistive devices for community distances       Electronically signed by:  Gallo Meza PT

## 2018-08-27 ENCOUNTER — CARE COORDINATION (OUTPATIENT)
Dept: CASE MANAGEMENT | Age: 73
End: 2018-08-27

## 2018-08-28 ENCOUNTER — HOSPITAL ENCOUNTER (OUTPATIENT)
Dept: PHYSICAL THERAPY | Age: 73
Setting detail: THERAPIES SERIES
Discharge: HOME | End: 2018-08-28
Payer: MEDICARE

## 2018-08-28 PROCEDURE — 97110 THERAPEUTIC EXERCISES: CPT

## 2018-08-28 PROCEDURE — 97140 MANUAL THERAPY 1/> REGIONS: CPT

## 2018-08-28 NOTE — FLOWSHEET NOTE
[facility], [address]  Phone: [phone]  Fax [phone]    Physical Therapy Daily Treatment Note  Date:  2018    Patient Name:  Humphrey Quevedo    :  1945  MRN: [id number]  Restrictions/Precautions:    Medical/Treatment Diagnosis Information:  Diagnosis: M17.12 (ICD-10-CM) - Primary localized osteoarthritis of left knee, s/p Total knee arthroscopy on 2018  Treatment Diagnosis: left knee pain M25.562, left knee effusion M25.462, left leg muscle weakness M62.82, gait abnormality 38.6  Insurance/Certification information:  [facility]  Physician Information:  Dr. Jesus Robbins of care signed (Y/N):     Date of Patient follow up with Physician: 18    G-Code (if applicable):      Date G-Code Applied:         Progress Note: [x]  Yes  []  No  Next due by: Visit #10       Latex Allergy:  [x]NO      []YES  Preferred Language for Healthcare:   [x]English       []other:    Visit # Insurance Allowable Requires auth   3 [facility]    [x]no        []yes:       Pain level:  2-8/10     SUBJECTIVE: Pt reports that she has been very emotional over the past couple of days. She admits that she has not been doing her HEP as much as she should be. She reports no time to put her Brenden Jasmina on before her appt today. She reports that she had not been using her walker in thevery beginning right after surgery. OBJECTIVE:   Spoke directly to PA regarding patient and he recommended decrease Aspirin to 1 per day, wear the Compression hose and use the walker. PT also had recommended the 2nd and 3rd item. See eval  Observation: Severe Ecchymosis (on ASA), Lat malleolus severe swelling  Test measurements:  0-95    RESTRICTIONS/PRECAUTIONS: none noted    Exercises/Interventions: Demo all HEP with some corrections for technique.     Therapeutic Ex Sets/sec Notes   gastroc stretches with strap 5x30 sec hep   Quad sets 10 sec 10x hep heelslides for flexion 10 sec 10x hep   slr 3 x 10 HEP   Long Sit HS S 30 sec x 5 HEP                            Manual Intervention     Pat mobs,Grade 1-2 oscillations,  Knee PROM 15 min                             NMR re-education                                       Therapeutic Exercise and NMR EXR  [x] (59351) Provided verbal/tactile cueing for activities related to strengthening, flexibility, endurance, ROM for improvements in LE, proximal hip, and core control with self care, mobility, lifting, ambulation.  [] (18846) Provided verbal/tactile cueing for activities related to improving balance, coordination, kinesthetic sense, posture, motor skill, proprioception  to assist with LE, proximal hip, and core control in self care, mobility, lifting, ambulation and eccentric single leg control.      NMR and Therapeutic Activities:    [] (13482 or 00935) Provided verbal/tactile cueing for activities related to improving balance, coordination, kinesthetic sense, posture, motor skill, proprioception and motor activation to allow for proper function of core, proximal hip and LE with self care and ADLs  [] (71353) Gait Re-education- Provided training and instruction to the patient for proper LE, core and proximal hip recruitment and positioning and eccentric body weight control with ambulation re-education including up and down stairs     Home Exercise Program:    [x] (12673) Reviewed/Progressed HEP activities related to strengthening, flexibility, endurance, ROM of core, proximal hip and LE for functional self-care, mobility, lifting and ambulation/stair navigation   [] (80566)Reviewed/Progressed HEP activities related to improving balance, coordination, kinesthetic sense, posture, motor skill, proprioception of core, proximal hip and LE for self care, mobility, lifting, and ambulation/stair navigation      Manual Treatments:  PROM / STM / Oscillations-Mobs:  G-I, II, III, IV (PA's, Inf., Post.)  [x] (68723) Provided manual therapy to mobilize LE, proximal hip and/or LS spine soft tissue/joints for the purpose of modulating pain, promoting relaxation,  increasing ROM, reducing/eliminating soft tissue swelling/inflammation/restriction, improving soft tissue extensibility and allowing for proper ROM for normal function with self care, mobility, lifting and ambulation. Modalities:  CP x 15 min  Charges:  Timed Code Treatment Minutes: 40   Total Treatment Minutes: 50     [] EVAL (LOW) 41497 (typically 20 minutes face-to-face)  [] EVAL (MOD) 50799 (typically 30 minutes face-to-face)  [] EVAL (HIGH) 04496 (typically 45 minutes face-to-face)  [] RE-EVAL     [x] VV(20199) x  2   [] IONTO  [] NMR (97345) x      [] VASO  [x] Manual (37051) x  1    [] Other:  [] TA x       [] Mech Traction (78668)  [] ES(attended) (97467)      [] ES (un) (74420):     GOALS: Patient stated goal: return to normal motion    Therapist goals for Patient:   Short Term Goals: To be achieved in: 2 weeks  1. Independent in HEP and progression per patient tolerance, in order to prevent re-injury. 2. Patient will have a decrease in pain to facilitate improvement in movement, function, and ADLs as indicated by Functional Deficits. Long Term Goals: To be achieved in: 12 weeks  1. Disability index score of 28% or less for the LEFS to assist with reaching prior level of function. 2. Patient will demonstrate increased PROM to 0-120 deg to allow for proper joint functioning as indicated by patients Functional Deficits. 3. Patient will demonstrate an increase in Strength to good proximal hip strength and control, within 5lb HHD in LE to allow for proper functional mobility as indicated by patients Functional Deficits. 4. Patient will return to ADL functional activities without increased symptoms or restriction.    5. Pt will return to normal ambulation without assistive devices for community distances         Progression Towards Functional goals:  [] Patient

## 2018-08-29 ENCOUNTER — CARE COORDINATION (OUTPATIENT)
Dept: CASE MANAGEMENT | Age: 73
End: 2018-08-29

## 2018-08-29 NOTE — CARE COORDINATION
8/28/18 @ 1800 - Informed that patient alerted orthovitals for urinary retention and shortness or breath. Patient states she is concerned that she is having urinary retention. States that she weighed herself when she came home and she had 10#'s that she had gained. States that she feels like she is \"filling up with fluid because operative leg is swollen\". Patient denies any difficulty urinating. States that urine is darker yellow and that she has been drinking less than a 1/2 gallon of fluids everyday. Denies any pain or burning with urination, difficulty starting or stopping her stream, but states that she does not feel like she urinates much during the day. Instructed patient to increase fluid intake to see if dark urine clears. Instructed to call RN/MD if she does not see improvement. Discussed shortness of breath alert with patient. States that she is not short of breath at rest, but when she exerts herself she is short of breath. Discussed use of spirometer and getting up and moving around more. And instructed to call if symptoms worsen. Will continue to follow.   Husam VILLAVICENCION  Care Transition Coordinator for ortho bundle  [phone]

## 2018-08-29 NOTE — CARE COORDINATION
Spoke with patient. States doing ok today. Discussed urinary retention. States that she increased her fluids last night, and urine has lightened. States that blood pressure has been running high 195/? Instructed patient to check blood pressure with her other cuff and compare the results and call back to give results. Incision status: States free from redness or drainage. Edema/Swelling: States swelling still present. Wearing brittanie hose. Pain level and status: States pain is tolerable. Use of pain medications: States taking pain meds with relief. Bowels: States constipated, passing \"hard balls\". Outpatient therapy: Continues twice a week.     Do you have all of your medications: Yes    Changes in medications: No    Follow up appointments:    Future Appointments  Date Time Provider Ricarda Calloway   8/30/2018 10:00 AM Kenya Uribe, PT Sarah Tijerina AND PT None   9/4/2018 2:20 PM MAXIMINO Melgar AND CHERYL   9/5/2018 11:00 AM Marlyn Duran PT [facility] AND PT None   9/7/2018 10:00 AM Marlyn Duran PT [facility] AND PT None     Kirsten Olmstead BSN  Care Transition Coordinator for ortho bundle  [phone]

## 2018-08-30 ENCOUNTER — HOSPITAL ENCOUNTER (OUTPATIENT)
Dept: PHYSICAL THERAPY | Age: 73
Setting detail: THERAPIES SERIES
Discharge: HOME | End: 2018-08-30
Payer: MEDICARE

## 2018-08-30 ENCOUNTER — CARE COORDINATION (OUTPATIENT)
Dept: CASE MANAGEMENT | Age: 73
End: 2018-08-30

## 2018-08-30 PROCEDURE — 97110 THERAPEUTIC EXERCISES: CPT | Performed by: PHYSICAL THERAPIST

## 2018-08-30 PROCEDURE — 97140 MANUAL THERAPY 1/> REGIONS: CPT | Performed by: PHYSICAL THERAPIST

## 2018-08-30 NOTE — CARE COORDINATION
Received call from patient that blood pressure is still elevated with wrist cuff, and unable to find her other cuff. Called patient and she did not answer. Left message to follow up with PCP today or go to the ED for high blood pressure. Will continue to follow.   Husam VILLAVICENCION  Care Transition Coordinator for ortho bundle  [phone]

## 2018-08-30 NOTE — CARE COORDINATION
Called patient for updates because of orthovitals alert. Left message for return call. Called patient. States she is going to get an appt to see Dr. Tanya Joya today or tomorrow. Instructed patient if blood pressure continues to stay elevated and she is unable to get into the physician to go the the ER. Patient states she is actually feeling well right now.   Crista Sena BSN  Care Transition Coordinator for ortho bundle  [phone]

## 2018-08-31 ENCOUNTER — CARE COORDINATION (OUTPATIENT)
Dept: CASE MANAGEMENT | Age: 73
End: 2018-08-31

## 2018-08-31 ENCOUNTER — APPOINTMENT (OUTPATIENT)
Dept: GENERAL RADIOLOGY | Age: 73
End: 2018-08-31
Payer: MEDICARE

## 2018-08-31 ENCOUNTER — HOSPITAL ENCOUNTER (EMERGENCY)
Age: 73
Discharge: HOME | End: 2018-08-31
Attending: EMERGENCY MEDICINE
Payer: MEDICARE

## 2018-08-31 VITALS
RESPIRATION RATE: 16 BRPM | HEART RATE: 66 BPM | SYSTOLIC BLOOD PRESSURE: 135 MMHG | HEIGHT: 62 IN | DIASTOLIC BLOOD PRESSURE: 63 MMHG | BODY MASS INDEX: 27.79 KG/M2 | OXYGEN SATURATION: 99 % | WEIGHT: 151 LBS | TEMPERATURE: 97.6 F

## 2018-08-31 DIAGNOSIS — H81.09 MENIERE'S DISEASE, UNSPECIFIED LATERALITY: ICD-10-CM

## 2018-08-31 DIAGNOSIS — R06.09 DOE (DYSPNEA ON EXERTION): ICD-10-CM

## 2018-08-31 DIAGNOSIS — R03.0 TRANSIENT ELEVATED BLOOD PRESSURE: Primary | ICD-10-CM

## 2018-08-31 LAB
A/G RATIO: 1.6 (ref 1.1–2.2)
ALBUMIN SERPL-MCNC: 3.9 G/DL (ref 3.4–5)
ALP BLD-CCNC: 83 U/L (ref 40–129)
ALT SERPL-CCNC: 13 U/L (ref 10–40)
ANION GAP SERPL CALCULATED.3IONS-SCNC: 10 MMOL/L (ref 3–16)
AST SERPL-CCNC: 20 U/L (ref 15–37)
BASOPHILS ABSOLUTE: 0.1 K/UL (ref 0–0.2)
BASOPHILS RELATIVE PERCENT: 1 %
BILIRUB SERPL-MCNC: 1.5 MG/DL (ref 0–1)
BUN BLDV-MCNC: 20 MG/DL (ref 7–20)
CALCIUM SERPL-MCNC: 9.3 MG/DL (ref 8.3–10.6)
CHLORIDE BLD-SCNC: 103 MMOL/L (ref 99–110)
CO2: 24 MMOL/L (ref 21–32)
CREAT SERPL-MCNC: 0.8 MG/DL (ref 0.6–1.2)
EOSINOPHILS ABSOLUTE: 0.2 K/UL (ref 0–0.6)
EOSINOPHILS RELATIVE PERCENT: 3.2 %
GFR AFRICAN AMERICAN: >60
GFR NON-AFRICAN AMERICAN: >60
GLOBULIN: 2.5 G/DL
GLUCOSE BLD-MCNC: 114 MG/DL (ref 70–99)
HCT VFR BLD CALC: 30 % (ref 36–48)
HEMOGLOBIN: 10.4 G/DL (ref 12–16)
LYMPHOCYTES ABSOLUTE: 1.2 K/UL (ref 1–5.1)
LYMPHOCYTES RELATIVE PERCENT: 21.4 %
MCH RBC QN AUTO: 32.4 PG (ref 26–34)
MCHC RBC AUTO-ENTMCNC: 34.7 G/DL (ref 31–36)
MCV RBC AUTO: 93.4 FL (ref 80–100)
MONOCYTES ABSOLUTE: 0.4 K/UL (ref 0–1.3)
MONOCYTES RELATIVE PERCENT: 7.4 %
NEUTROPHILS ABSOLUTE: 3.7 K/UL (ref 1.7–7.7)
NEUTROPHILS RELATIVE PERCENT: 67 %
PDW BLD-RTO: 14.8 % (ref 12.4–15.4)
PLATELET # BLD: 278 K/UL (ref 135–450)
PMV BLD AUTO: 7.9 FL (ref 5–10.5)
POTASSIUM REFLEX MAGNESIUM: 4.2 MMOL/L (ref 3.5–5.1)
PRO-BNP: 908 PG/ML (ref 0–124)
RBC # BLD: 3.21 M/UL (ref 4–5.2)
SODIUM BLD-SCNC: 137 MMOL/L (ref 136–145)
TOTAL PROTEIN: 6.4 G/DL (ref 6.4–8.2)
TROPONIN: <0.01 NG/ML
WBC # BLD: 5.5 K/UL (ref 4–11)

## 2018-08-31 PROCEDURE — 93005 ELECTROCARDIOGRAM TRACING: CPT | Performed by: EMERGENCY MEDICINE

## 2018-08-31 PROCEDURE — 71045 X-RAY EXAM CHEST 1 VIEW: CPT

## 2018-08-31 PROCEDURE — 83880 ASSAY OF NATRIURETIC PEPTIDE: CPT

## 2018-08-31 PROCEDURE — 99285 EMERGENCY DEPT VISIT HI MDM: CPT

## 2018-08-31 PROCEDURE — 80053 COMPREHEN METABOLIC PANEL: CPT

## 2018-08-31 PROCEDURE — 84484 ASSAY OF TROPONIN QUANT: CPT

## 2018-08-31 PROCEDURE — 85025 COMPLETE CBC W/AUTO DIFF WBC: CPT

## 2018-08-31 PROCEDURE — 93010 ELECTROCARDIOGRAM REPORT: CPT | Performed by: INTERNAL MEDICINE

## 2018-08-31 RX ORDER — MECLIZINE HCL 12.5 MG/1
12.5 TABLET ORAL 3 TIMES DAILY PRN
Qty: 20 TABLET | Refills: 0 | Status: SHIPPED | OUTPATIENT
Start: 2018-08-31 | End: 2018-09-10

## 2018-08-31 ASSESSMENT — PAIN DESCRIPTION - PAIN TYPE: TYPE: ACUTE PAIN

## 2018-08-31 ASSESSMENT — PAIN SCALES - GENERAL: PAINLEVEL_OUTOF10: 3

## 2018-08-31 NOTE — ED PROVIDER NOTES
CHIEF COMPLAINT  Hypertension (pt came in tonight for high blood pressure; pt states that she has been going to physical therapy for a knee replacement that she had last tuesday; pt stated that her B/P has been up for the therapy and that the therapist told her she should notify her doctor; pt states that this evening her B/P was 199/101 and that her doctor's office was closed) and Shortness of Breath (pt has a multi c/o; chest pain; SOB; pain in back)      HISTORY OF PRESENT ILLNESS  Samm Sheets is a 68 y.o. female presents to the ED with elev. BP, 199/101 at her physical therapy session earlier today, doctor's office closed, has been going to PT since knee replacement surgery last week, answers yes to all ROS questions, multiple complaints, but states none of them today. No hx of DVT/PE, no current CP, no numbness/weakness, no vision changes, states she has had intermittent CP/SOB, no alleviating/aggravating factors, back aching since surgery on leg, anxious about her BP being high, no hx of MI/CVA, hx of meneire's and has hearing loss/vertigo/tinnitus from time to time, No other complaints, modifying factors or associated symptoms. I have reviewed the following from the nursing documentation.     Past Medical History:   Diagnosis Date    Allergic rhinitis     Anxiety     Arthritis     Back pain     Cancer (HCC)     skin    Depression     Hypertension     Meniere's disease     Scoliosis      Past Surgical History:   Procedure Laterality Date    BACK SURGERY      CARPAL TUNNEL RELEASE      CHOLECYSTECTOMY      COLONOSCOPY  3/25/13    normal    JOINT REPLACEMENT      WA TOTAL KNEE ARTHROPLASTY Left 8/21/2018    LEFT TOTAL KNEE MAKOPLASTY WITH PLATELET RICH PLASMA AND ADDUCTOR CANAL BLOCK FOR PAIN CONTROL                   RUI MAKOPLASTY  CPT CODE - 01971 performed by Abel Paula MD at [facility] Right 03/21/2018     Family History   Problem Relation Age of Onset  Cancer Father         melanoma    Parkinsonism Mother      Social History     Social History    Marital status:      Spouse name: N/A    Number of children: N/A    Years of education: N/A     Occupational History    Not on file. Social History Main Topics    Smoking status: Never Smoker    Smokeless tobacco: Never Used    Alcohol use No    Drug use: No    Sexual activity: Not on file     Other Topics Concern    Not on file     Social History Narrative    No narrative on file     No current facility-administered medications for this encounter. Current Outpatient Prescriptions   Medication Sig Dispense Refill    aspirin 325 MG EC tablet Take 1 tablet by mouth 2 times daily (Patient taking differently: Take 325 mg by mouth daily ) 60 tablet 0    vitamin D (CHOLECALCIFEROL) 1000 UNIT TABS tablet Take 1,000 Units by mouth daily      MELOXICAM PO Take 15 mg by mouth daily       vitamin B-1 (THIAMINE) 100 MG tablet Take 100 mg by mouth daily      Ascorbic Acid (VITAMIN C) 250 MG tablet Take 250 mg by mouth daily      Multiple Vitamin (MULTI-VITAMIN DAILY PO) Take  by mouth.  fluoxetine (PROZAC) 40 MG capsule Take 40 mg by mouth nightly       oxyCODONE-acetaminophen (PERCOCET) 5-325 MG per tablet Take 1 tablet by mouth every 4 hours as needed for Pain. .       Allergies   Allergen Reactions    Indocin [Indomethacin] Other (See Comments) and Nausea And Vomiting     This allergy is listed on her history and physical form. Patient unaware of this medication. REVIEW OF SYSTEMS  10 systems reviewed, pertinent positives per HPI otherwise noted to be negative. PHYSICAL EXAM  /63   Pulse 66   Temp 97.6 °F (36.4 °C) (Oral)   Resp 16   Ht 5' 2\" (1.575 m)   Wt 151 lb (68.5 kg)   SpO2 99%   BMI 27.62 kg/m²   GENERAL APPEARANCE: Awake and alert. Cooperative. No acute distress  HEAD: Normocephalic. Atraumatic. EYES: PERRL. EOM's grossly intact.  No nystagmus  ENT: Mucous Phosphatase 83 40 - 129 U/L    ALT 13 10 - 40 U/L    AST 20 15 - 37 U/L    Globulin 2.5 g/dL   Troponin   Result Value Ref Range    Troponin <0.01 <0.01 ng/mL   Brain Natriuretic Peptide   Result Value Ref Range    Pro- (H) 0 - 124 pg/mL   EKG 12 Lead   Result Value Ref Range    Ventricular Rate 74 BPM    Atrial Rate 74 BPM    P-R Interval 174 ms    QRS Duration 76 ms    Q-T Interval 386 ms    QTc Calculation (Bazett) 428 ms    P Axis 52 degrees    R Axis -9 degrees    T Axis 29 degrees    Diagnosis       Normal sinus rhythmPossible Left atrial enlargementBorderline ECGWhen compared with ECG of 28-FEB-2018 12:36,No significant change was foundConfirmed by JANETTE Salazar MD ([id number]) on 8/31/2018 8:41:44 AM     EKG  The Ekg interpreted by me shows  normal sinus rhythm with a rate of 74  Axis is   Normal  QTc is  normal  Intervals and Durations are unremarkable. ST Segments: normal  No significant change from prior EKG dated 2-28-18    HEART SCORE:    History: +0 for low suspicion  EKG: +0 for normal EKG   Age: +4 for age >65 years  Risk factors (includes HLD, HTN, DM, tobacco use, obesity, and +FHx): +1 for 1-2 risk factors  Initial troponin: +0 for negative troponin    Heart score: 3. This falls under the following category: Score of 0-3, which indicates a very low risk for major adverse cardiac event and supports early discharge    RADIOLOGY  Xr Chest Portable    Result Date: 8/31/2018  EXAMINATION: SINGLE XRAY VIEW OF THE CHEST 8/31/2018 2:21 am COMPARISON: Chest CT 12/23/2017 HISTORY: ORDERING SYSTEM PROVIDED HISTORY: chest pain TECHNOLOGIST PROVIDED HISTORY: Reason for exam:->chest pain Ordering Physician Provided Reason for Exam: chest pain Acuity: Acute Type of Exam: Initial     No pneumothorax, pleural effusion or airspace consolidation. Normal heart size and mediastinal contours allowing for patient rotation and dextrocurvature of the thoracic spine.   Probably artifact along the left

## 2018-08-31 NOTE — ED NOTES
Ambulated pt from room to restroom; pt's Pulse Ox stayed from 97% to 100%; heart rate in the 80's; Dr. Kt Garrett notified     Liu Braun RN  08/31/18 2077

## 2018-08-31 NOTE — CARE COORDINATION
Followed up with patient. States that she went to the ED last evening for a high blood pressure. States her b/p was 210-/112. States she was concerned, having headaches and did not feel well. States she feels better today. States that she has increased fluid intake, and calf measurements are down 4 cm, so she was happy. Said b/p was 135/60's. States everything else is going well.   Rowena Carrera BSN  Care Transition Coordinator for [facility]  [phone]

## 2018-09-01 ENCOUNTER — TELEPHONE (OUTPATIENT)
Dept: ORTHOPEDIC SURGERY | Age: 73
End: 2018-09-01

## 2018-09-02 ENCOUNTER — TELEPHONE (OUTPATIENT)
Dept: ORTHOPEDIC SURGERY | Age: 73
End: 2018-09-02

## 2018-09-02 NOTE — TELEPHONE ENCOUNTER
Patient alerted in OV for SOB. Called patient currently resting and denies SOB. Only gets dyspnea on exertion. Denies SOB. Patient also getting increased calf size but no pain. Was evaluated in ER on 8/31 and was cleared to go home. Taking ASA to prevent DVT. Patient instructed that if her SOB gets worse or she develops chest pain she needs to be evaluated by a physician in ER.

## 2018-09-04 ENCOUNTER — OFFICE VISIT (OUTPATIENT)
Dept: ORTHOPEDIC SURGERY | Age: 73
End: 2018-09-04

## 2018-09-04 VITALS
BODY MASS INDEX: 27.79 KG/M2 | WEIGHT: 151.01 LBS | DIASTOLIC BLOOD PRESSURE: 84 MMHG | HEART RATE: 73 BPM | HEIGHT: 62 IN | SYSTOLIC BLOOD PRESSURE: 162 MMHG

## 2018-09-04 DIAGNOSIS — M79.662 PAIN AND SWELLING OF LOWER LEG, LEFT: ICD-10-CM

## 2018-09-04 DIAGNOSIS — Z96.652 STATUS POST TOTAL LEFT KNEE REPLACEMENT: Primary | ICD-10-CM

## 2018-09-04 DIAGNOSIS — M79.89 PAIN AND SWELLING OF LOWER LEG, LEFT: ICD-10-CM

## 2018-09-04 DIAGNOSIS — M25.562 LEFT KNEE PAIN, UNSPECIFIED CHRONICITY: ICD-10-CM

## 2018-09-04 PROCEDURE — 99024 POSTOP FOLLOW-UP VISIT: CPT | Performed by: PHYSICIAN ASSISTANT

## 2018-09-04 RX ORDER — OXYCODONE HYDROCHLORIDE AND ACETAMINOPHEN 5; 325 MG/1; MG/1
1 TABLET ORAL EVERY 6 HOURS PRN
Qty: 28 TABLET | Refills: 0 | Status: SHIPPED | OUTPATIENT
Start: 2018-09-04 | End: 2018-09-11

## 2018-09-04 NOTE — PROGRESS NOTES
History of present illness: The patient returns today after left total knee replacement. Patient is having trouble controlling her pain. She also complains of continued shortness of breath only with exertion, calf pain, and lower extremity swelling. . There have been no fevers or chills. Pain Assessment  Location of Pain: Knee  Location Modifiers: Left, Anterior  Severity of Pain: 5  Quality of Pain: Dull, Aching, Sharp  Duration of Pain: Persistent  Frequency of Pain: Intermittent  Aggravating Factors: Walking, Other (Comment) (toughing knee)  Limiting Behavior: Some  Relieving Factors: Rest]    Physical examination: The incision is clean, dry, and intact with no drainage or signs of infection. Range of motion is 0 degrees of extension to 95 degrees of flexion. There is increased swelling left lower extremity compared to right.  +2 pitting edema. Positive Homans sign. Calf tenderness present. Neurovascular exam is intact. Radiographs: X-rays taken today including AP, lateral, and skyline views of the left knee show excellent alignment of the prosthesis. No evidence of septic or aseptic loosening or periprosthetic fractures. Assessment/plan: We would like to begin outpatient physical therapy to restore range of motion and strength. The patient was given local wound care instructions. We did refill their pain medication today. We will followup in 2 weeks for reevaluation. Venous Doppler ordered to evaluate for DVT.

## 2018-09-05 ENCOUNTER — HOSPITAL ENCOUNTER (OUTPATIENT)
Dept: PHYSICAL THERAPY | Age: 73
Setting detail: THERAPIES SERIES
Discharge: HOME | End: 2018-09-05
Payer: MEDICARE

## 2018-09-05 ENCOUNTER — HOSPITAL ENCOUNTER (OUTPATIENT)
Dept: VASCULAR LAB | Age: 73
Discharge: HOME | End: 2018-09-05
Payer: MEDICARE

## 2018-09-05 DIAGNOSIS — M25.562 LEFT KNEE PAIN, UNSPECIFIED CHRONICITY: ICD-10-CM

## 2018-09-05 DIAGNOSIS — M79.662 PAIN AND SWELLING OF LOWER LEG, LEFT: ICD-10-CM

## 2018-09-05 DIAGNOSIS — Z96.652 STATUS POST TOTAL LEFT KNEE REPLACEMENT: ICD-10-CM

## 2018-09-05 DIAGNOSIS — M79.89 PAIN AND SWELLING OF LOWER LEG, LEFT: ICD-10-CM

## 2018-09-05 LAB
EKG ATRIAL RATE: 74 BPM
EKG DIAGNOSIS: NORMAL
EKG P AXIS: 52 DEGREES
EKG P-R INTERVAL: 174 MS
EKG Q-T INTERVAL: 386 MS
EKG QRS DURATION: 76 MS
EKG QTC CALCULATION (BAZETT): 428 MS
EKG R AXIS: -9 DEGREES
EKG T AXIS: 29 DEGREES
EKG VENTRICULAR RATE: 74 BPM

## 2018-09-05 PROCEDURE — 93971 EXTREMITY STUDY: CPT

## 2018-09-05 PROCEDURE — 97140 MANUAL THERAPY 1/> REGIONS: CPT

## 2018-09-05 PROCEDURE — 97016 VASOPNEUMATIC DEVICE THERAPY: CPT

## 2018-09-05 PROCEDURE — 97110 THERAPEUTIC EXERCISES: CPT

## 2018-09-05 NOTE — FLOWSHEET NOTE
drinking more water, follow up with PCP for BP, DVT s/s, anesthesia side effects x15'    Manual Intervention     Pat mobs sup/inf only, HS S, Mod HF S, Grade 1-2 oscillations,  Knee PROM,  20 min                             NMR re-education     Tandem balance L LE post 5x10\" Table prn                                Therapeutic Exercise and NMR EXR  [x] (96109) Provided verbal/tactile cueing for activities related to strengthening, flexibility, endurance, ROM for improvements in LE, proximal hip, and core control with self care, mobility, lifting, ambulation.  [] (77136) Provided verbal/tactile cueing for activities related to improving balance, coordination, kinesthetic sense, posture, motor skill, proprioception  to assist with LE, proximal hip, and core control in self care, mobility, lifting, ambulation and eccentric single leg control.      NMR and Therapeutic Activities:    [] (34724 or 15546) Provided verbal/tactile cueing for activities related to improving balance, coordination, kinesthetic sense, posture, motor skill, proprioception and motor activation to allow for proper function of core, proximal hip and LE with self care and ADLs  [] (42132) Gait Re-education- Provided training and instruction to the patient for proper LE, core and proximal hip recruitment and positioning and eccentric body weight control with ambulation re-education including up and down stairs     Home Exercise Program:    [x] (42050) Reviewed/Progressed HEP activities related to strengthening, flexibility, endurance, ROM of core, proximal hip and LE for functional self-care, mobility, lifting and ambulation/stair navigation   [] (19357)Reviewed/Progressed HEP activities related to improving balance, coordination, kinesthetic sense, posture, motor skill, proprioception of core, proximal hip and LE for self care, mobility, lifting, and ambulation/stair navigation      Manual Treatments:  PROM / STM / Oscillations-Mobs:  G-I, II, III, IV (PA's, Inf., Post.)  [x] (85727) Provided manual therapy to mobilize LE, proximal hip and/or LS spine soft tissue/joints for the purpose of modulating pain, promoting relaxation,  increasing ROM, reducing/eliminating soft tissue swelling/inflammation/restriction, improving soft tissue extensibility and allowing for proper ROM for normal function with self care, mobility, lifting and ambulation. Modalities:  vpulse x 15 minCP x 15 min Pt declined, will do at home  Charges:  Timed Code Treatment Minutes: 55   Total Treatment Minutes: 70     [] EVAL (LOW) 09067 (typically 20 minutes face-to-face)  [] EVAL (MOD) 74512 (typically 30 minutes face-to-face)  [] EVAL (HIGH) 08251 (typically 45 minutes face-to-face)  [] RE-EVAL     [x] VK(35783) x  3   [] IONTO  [] NMR (34846) x      [x] VASO  [x] Manual (65736) x  1    [] Other:  [] TA x       [] Mech Traction (46202)  [] ES(attended) (01783)      [] ES (un) (55825):     GOALS: Patient stated goal: return to normal motion    Therapist goals for Patient:   Short Term Goals: To be achieved in: 2 weeks  1. Independent in HEP and progression per patient tolerance, in order to prevent re-injury. 2. Patient will have a decrease in pain to facilitate improvement in movement, function, and ADLs as indicated by Functional Deficits. Long Term Goals: To be achieved in: 12 weeks  1. Disability index score of 28% or less for the LEFS to assist with reaching prior level of function. 2. Patient will demonstrate increased PROM to 0-120 deg to allow for proper joint functioning as indicated by patients Functional Deficits. 3. Patient will demonstrate an increase in Strength to good proximal hip strength and control, within 5lb HHD in LE to allow for proper functional mobility as indicated by patients Functional Deficits. 4. Patient will return to ADL functional activities without increased symptoms or restriction.    5. Pt will return to normal ambulation without assistive devices for community distances         Progression Towards Functional goals:  [x] Patient is progressing as expected towards functional goals listed. [] Progression is slowed due to complexities listed. [] Progression has been slowed due to co-morbidities. [] Plan just implemented, too soon to assess goals progression  [] Other:     ASSESSMENT: Pt ed as stated above. Patient did not appear with SOB today even after TE. Improvement noted knee flexion today. Discussed use of returning to cane if walking is painful or she feels herself limping. Treatment/Activity Tolerance:  [x] Patient tolerated treatment well [] Patient limited by fatique  [] Patient limited by pain  [] Patient limited by other medical complications  [] Other:     Prognosis: [x] Good [] Fair  [] Poor    Patient Requires Follow-up: [x] Yes  [] No    PLAN: Recumbent Bike next visit.   [x] Continue per plan of care [] Alter current plan (see comments)  [] Plan of care initiated [] Hold pending MD visit [] Discharge    Electronically signed by: Shelton Pittman PT

## 2018-09-07 ENCOUNTER — HOSPITAL ENCOUNTER (OUTPATIENT)
Dept: PHYSICAL THERAPY | Age: 73
Setting detail: THERAPIES SERIES
Discharge: HOME | End: 2018-09-07
Payer: MEDICARE

## 2018-09-07 ENCOUNTER — CARE COORDINATION (OUTPATIENT)
Dept: CASE MANAGEMENT | Age: 73
End: 2018-09-07

## 2018-09-07 PROCEDURE — 97110 THERAPEUTIC EXERCISES: CPT

## 2018-09-07 PROCEDURE — 97140 MANUAL THERAPY 1/> REGIONS: CPT

## 2018-09-07 NOTE — FLOWSHEET NOTE
Patient is progressing as expected towards functional goals listed. [] Progression is slowed due to complexities listed. [] Progression has been slowed due to co-morbidities. [] Plan just implemented, too soon to assess goals progression  [] Other:     ASSESSMENT: Improvement noted with an increase in knee flexion from 100 to 110 degrees. Patient was able to increase flexion by 10 degrees after Manuals and Heelslides today. Treatment/Activity Tolerance:  [x] Patient tolerated treatment well [] Patient limited by fatique  [] Patient limited by pain  [] Patient limited by other medical complications  [] Other:     Prognosis: [x] Good [] Fair  [] Poor    Patient Requires Follow-up: [x] Yes  [] No    PLAN: Progress to [facility] next visit.   [x] Continue per plan of care [] Alter current plan (see comments)  [] Plan of care initiated [] Hold pending MD visit [] Discharge    Electronically signed by: Thomes Cogan PT

## 2018-09-10 ENCOUNTER — CARE COORDINATION (OUTPATIENT)
Dept: CASE MANAGEMENT | Age: 73
End: 2018-09-10

## 2018-09-10 ENCOUNTER — HOSPITAL ENCOUNTER (OUTPATIENT)
Dept: PHYSICAL THERAPY | Age: 73
Setting detail: THERAPIES SERIES
Discharge: HOME | End: 2018-09-10
Payer: MEDICARE

## 2018-09-10 PROCEDURE — 97110 THERAPEUTIC EXERCISES: CPT

## 2018-09-10 PROCEDURE — 97140 MANUAL THERAPY 1/> REGIONS: CPT

## 2018-09-10 NOTE — FLOWSHEET NOTE
[facility], [address] [place] [address] [place]  Phone: [phone]  Fax [phone]    Physical Therapy Daily Treatment Note  Date:  9/10/2018    Patient Name:  Areli Wang    :  1945  MRN: [id number]  Restrictions/Precautions:    Medical/Treatment Diagnosis Information:  Diagnosis: M17.12 (ICD-10-CM) - Primary localized osteoarthritis of left knee, s/p Total knee arthroscopy on 2018  Treatment Diagnosis: left knee pain M25.562, left knee effusion M25.462, left leg muscle weakness M62.82, gait abnormality 61.4  Insurance/Certification information:  [facility]  Physician Information:  Dr. Viviana Stone of care signed (Y/N):     Date of Patient follow up with Physician: 18    G-Code (if applicable):      Date G-Code Applied:         Progress Note: []  Yes  []  No  Next due by: Visit #10       Latex Allergy:  [x]NO      []YES  Preferred Language for Healthcare:   [x]English       []other:    Visit # Insurance Allowable Requires auth   7 [facility]    [x]no        []yes:       Pain level:  3/10     SUBJECTIVE: Patient reports that her knee is doing pretty well. She reports that she has been getting some high BP on the top number reading at home. OBJECTIVE: Instructed patient to call PCP regarding High BP readings at home and in the PT clinic.   Test measurements:  AAROM 0-114;   BP pre-session 182/78, post session 147/71, pulse 72,     RESTRICTIONS/PRECAUTIONS: none noted    Exercises/Interventions:     Therapeutic Ex Sets/sec Notes   gastroc stretches with strap hep   Quad sets hep   heelslides for flexion 10 sec 10xhep   SLR HEP   Long Sit HS S HEP   SAQ 5# 3x10    Bridges w/ B Abd GOL 2x10    SL clams GOL  SL hip abd 3x10  2x10         Std L hip abd/hip ext  2 x 10    Mini squats 3x10 Wall prn, cues for form   Standing DHR's 3 x 10    MARLENY  TKE 60#/ABD 30# 3 x 10                        Incline Board 30 sec x 3    Bike  5 min    Pt ed: HEP, edema control (ice, elevation, stockings), drinking more water, follow up with PCP for BP, DVT s/s, anesthesia side effects x15'    Manual Intervention     Pat mobs sup/inf only, HS S, Mod HF S, Grade 1-2 oscillations,  Knee PROM,  20 min                             NMR re-education     Tandem balance LR LE post 5x10\" Wall prn                                Therapeutic Exercise and NMR EXR  [x] (64693) Provided verbal/tactile cueing for activities related to strengthening, flexibility, endurance, ROM for improvements in LE, proximal hip, and core control with self care, mobility, lifting, ambulation.  [] (95376) Provided verbal/tactile cueing for activities related to improving balance, coordination, kinesthetic sense, posture, motor skill, proprioception  to assist with LE, proximal hip, and core control in self care, mobility, lifting, ambulation and eccentric single leg control.      NMR and Therapeutic Activities:    [] (95703 or 82489) Provided verbal/tactile cueing for activities related to improving balance, coordination, kinesthetic sense, posture, motor skill, proprioception and motor activation to allow for proper function of core, proximal hip and LE with self care and ADLs  [] (15745) Gait Re-education- Provided training and instruction to the patient for proper LE, core and proximal hip recruitment and positioning and eccentric body weight control with ambulation re-education including up and down stairs     Home Exercise Program:    [x] (58537) Reviewed/Progressed HEP activities related to strengthening, flexibility, endurance, ROM of core, proximal hip and LE for functional self-care, mobility, lifting and ambulation/stair navigation   [] (62403)Reviewed/Progressed HEP activities related to improving balance, coordination, kinesthetic sense, posture, motor skill, proprioception of core, proximal hip and LE for self care, mobility, lifting, and ambulation/stair navigation      Manual Treatments: PROM / STM / Oscillations-Mobs:  G-I, II, III, IV (PA's, Inf., Post.)  [x] (52576) Provided manual therapy to mobilize LE, proximal hip and/or LS spine soft tissue/joints for the purpose of modulating pain, promoting relaxation,  increasing ROM, reducing/eliminating soft tissue swelling/inflammation/restriction, improving soft tissue extensibility and allowing for proper ROM for normal function with self care, mobility, lifting and ambulation. Modalities:   Pt declined today  Charges:  Timed Code Treatment Minutes: 55   Total Treatment Minutes: 55     [] EVAL (LOW) 52862 (typically 20 minutes face-to-face)  [] EVAL (MOD) 91780 (typically 30 minutes face-to-face)  [] EVAL (HIGH) 83222 (typically 45 minutes face-to-face)  [] RE-EVAL     [x] ZK(17160) x  3   [] IONTO  [] NMR (06667) x      [] VASO  [x] Manual (79606) x  1    [] Other:  [] TA x       [] Mech Traction (22498)  [] ES(attended) (06570)      [] ES (un) (01650):     GOALS: Patient stated goal: return to normal motion    Therapist goals for Patient:   Short Term Goals: To be achieved in: 2 weeks  1. Independent in HEP and progression per patient tolerance, in order to prevent re-injury. 2. Patient will have a decrease in pain to facilitate improvement in movement, function, and ADLs as indicated by Functional Deficits. Long Term Goals: To be achieved in: 12 weeks  1. Disability index score of 28% or less for the LEFS to assist with reaching prior level of function. 2. Patient will demonstrate increased PROM to 0-120 deg to allow for proper joint functioning as indicated by patients Functional Deficits. 3. Patient will demonstrate an increase in Strength to good proximal hip strength and control, within 5lb HHD in LE to allow for proper functional mobility as indicated by patients Functional Deficits. 4. Patient will return to ADL functional activities without increased symptoms or restriction.    5. Pt will return to normal ambulation without assistive devices for community distances         Progression Towards Functional goals:  [x] Patient is progressing as expected towards functional goals listed. [] Progression is slowed due to complexities listed. [] Progression has been slowed due to co-morbidities. [] Plan just implemented, too soon to assess goals progression  [] Other:     ASSESSMENT: Improvement noted with an increase in knee flexion from 110 to 114 degrees. Patient was to progress strengthening TE with no c/o knee pain, only muscle fatigue. Treatment/Activity Tolerance:  [x] Patient tolerated treatment well [] Patient limited by fatique  [] Patient limited by pain  [] Patient limited by other medical complications  [] Other:     Prognosis: [x] Good [] Fair  [] Poor    Patient Requires Follow-up: [x] Yes  [] No    PLAN: Pt to see PCP regarding BP. Progress to 200 [facility] next visit.   [x] Continue per plan of care [] Alter current plan (see comments)  [] Plan of care initiated [] Hold pending MD visit [] Discharge    Electronically signed by: Betty Medina PT

## 2018-09-12 ENCOUNTER — HOSPITAL ENCOUNTER (OUTPATIENT)
Dept: PHYSICAL THERAPY | Age: 73
Setting detail: THERAPIES SERIES
Discharge: HOME | End: 2018-09-12
Payer: MEDICARE

## 2018-09-12 PROCEDURE — 97140 MANUAL THERAPY 1/> REGIONS: CPT

## 2018-09-12 PROCEDURE — 97110 THERAPEUTIC EXERCISES: CPT

## 2018-09-12 NOTE — FLOWSHEET NOTE
[facility], [address] [place]  Phone: [phone]  Fax [phone]      ATHLETIC TRAINING [address]   Date:  2018    Patient Name:  Roselia Roberson    :  1945  MRN: [id number]  Restrictions/Precautions:    Medical/Treatment Diagnosis Information:  ·    ·    Physician Information:       Weeks Post-op  8 wks  12 wks 16 wks 20 wks   24 wks                            Activity Log                                                  DOS/DOI:                                                    Date:     ATC communication    Bike    Elliptical    Treadmill    Airdyne        Gastroc stretch    Soleus stretch    Hamstring stretch    ITB stretch    Hip Flexor stretch    Quad stretch    Adductor stretch        Weight Shifting sp                              fp                              tp    Lateral walking (with/w/o TB)        Balance: PEP/Trista board                   SLS          Star excursion load/explode          Extremity reach UE/LE        Leg Press Darrion. 80# 2x12                     Ecc.                      Inv. Calf Press Darrion. Ecc.                        Inv.        MARLENY   Flex               ABd 30# R/L 3x10              ADd              TKE 60# R/L 30x5'              Ext        Steps Up               Up and Over 4\" R/L 15x ea              Down               Lateral               Rotation        Squats  mini                  wall                 BOSU         Lunges:  Lunge to Balance                   Balance to Lunge                   Walking        Knee Extension Bilat. Ecc.                               Inv. Hamstring Curls Bilat. 30# 2x12                              Ecc.                               Inv.        Soleus Press Bilat.                            Ecc.                           Inv.

## 2018-09-12 NOTE — FLOWSHEET NOTE
Standing DHR's 3 x 10    MARLENY  TKE 60#/ABD 30# 3 x 10 W/ AT                       Incline Board 30 sec x 3    Bike  5 min    Pt ed: HEP, edema control (ice, elevation, stockings), drinking more water, follow up with PCP for BP, DVT s/s, anesthesia side effects x15'    Manual Intervention     Pat mobs sup/inf only, HS S, Mod HF S, Grade 1-2 oscillations,  Knee PROM,  20 min                             NMR re-education     Tandem balance LR LE post 5x10\" Wall prn                                Therapeutic Exercise and NMR EXR  [x] (78507) Provided verbal/tactile cueing for activities related to strengthening, flexibility, endurance, ROM for improvements in LE, proximal hip, and core control with self care, mobility, lifting, ambulation.  [] (12383) Provided verbal/tactile cueing for activities related to improving balance, coordination, kinesthetic sense, posture, motor skill, proprioception  to assist with LE, proximal hip, and core control in self care, mobility, lifting, ambulation and eccentric single leg control.      NMR and Therapeutic Activities:    [] (40398 or 88366) Provided verbal/tactile cueing for activities related to improving balance, coordination, kinesthetic sense, posture, motor skill, proprioception and motor activation to allow for proper function of core, proximal hip and LE with self care and ADLs  [] (13630) Gait Re-education- Provided training and instruction to the patient for proper LE, core and proximal hip recruitment and positioning and eccentric body weight control with ambulation re-education including up and down stairs     Home Exercise Program:    [x] (73476) Reviewed/Progressed HEP activities related to strengthening, flexibility, endurance, ROM of core, proximal hip and LE for functional self-care, mobility, lifting and ambulation/stair navigation   [] (64597)Reviewed/Progressed HEP activities related to improving balance, coordination, kinesthetic sense, posture, motor skill, proprioception of core, proximal hip and LE for self care, mobility, lifting, and ambulation/stair navigation      Manual Treatments:  PROM / STM / Oscillations-Mobs:  G-I, II, III, IV (PA's, Inf., Post.)  [x] (92698) Provided manual therapy to mobilize LE, proximal hip and/or LS spine soft tissue/joints for the purpose of modulating pain, promoting relaxation,  increasing ROM, reducing/eliminating soft tissue swelling/inflammation/restriction, improving soft tissue extensibility and allowing for proper ROM for normal function with self care, mobility, lifting and ambulation. Modalities:   Pt declined today  Charges:  Timed Code Treatment Minutes: 55   Total Treatment Minutes: 55     [] EVAL (LOW) 28858 (typically 20 minutes face-to-face)  [] EVAL (MOD) 79571 (typically 30 minutes face-to-face)  [] EVAL (HIGH) 14806 (typically 45 minutes face-to-face)  [] RE-EVAL     [x] MW(48253) x  3   [] IONTO  [] NMR (35603) x      [] VASO  [x] Manual (95100) x  1    [] Other:  [] TA x       [] Mech Traction (91161)  [] ES(attended) (35611)      [] ES (un) (19230):     GOALS: Patient stated goal: return to normal motion    Therapist goals for Patient:   Short Term Goals: To be achieved in: 2 weeks  1. Independent in HEP and progression per patient tolerance, in order to prevent re-injury. 2. Patient will have a decrease in pain to facilitate improvement in movement, function, and ADLs as indicated by Functional Deficits. Long Term Goals: To be achieved in: 12 weeks  1. Disability index score of 28% or less for the LEFS to assist with reaching prior level of function. 2. Patient will demonstrate increased PROM to 0-120 deg to allow for proper joint functioning as indicated by patients Functional Deficits. 3. Patient will demonstrate an increase in Strength to good proximal hip strength and control, within 5lb HHD in LE to allow for proper functional mobility as indicated by patients Functional Deficits.    4. Patient will return to ADL functional activities without increased symptoms or restriction. 5. Pt will return to normal ambulation without assistive devices for community distances         Progression Towards Functional goals:  [x] Patient is progressing as expected towards functional goals listed. [] Progression is slowed due to complexities listed. [] Progression has been slowed due to co-morbidities. [] Plan just implemented, too soon to assess goals progression  [] Other:     ASSESSMENT: Improvement noted with an increase in knee flexion from 114 to 120 degrees. Recorded BP is lower than last visit pre and post PT. Discussed CP today to control pain and inflammation. Treatment/Activity Tolerance:  [x] Patient tolerated treatment well [] Patient limited by fatique  [] Patient limited by pain  [] Patient limited by other medical complications  [] Other:     Prognosis: [x] Good [] Fair  [] Poor    Patient Requires Follow-up: [x] Yes  [] No    PLAN:COntinue strengthening, ROM and balance.   [x] Continue per plan of care [] Alter current plan (see comments)  [] Plan of care initiated [] Hold pending MD visit [] Discharge    Electronically signed by: Estelita Urbina PT

## 2018-09-14 ENCOUNTER — APPOINTMENT (OUTPATIENT)
Dept: PHYSICAL THERAPY | Age: 73
End: 2018-09-14
Payer: MEDICARE

## 2018-09-17 ENCOUNTER — CARE COORDINATION (OUTPATIENT)
Dept: CASE MANAGEMENT | Age: 73
End: 2018-09-17

## 2018-09-17 NOTE — CARE COORDINATION
Spoke with patient. States she was started on blood pressure medication. States blood pressure is still running around 150's/70-80's. Incision status: States free from redness or drainage. Edema/Swelling: States swelling improving. Pain level and status: States pain is tolerable. Use of pain medications: States not taking pain meds. Bowels: No complaints. Outpatient therapy: Continues once a week.     Do you have all of your medications: Yes    Changes in medications: No    Follow up appointments:    Future Appointments  Date Time Provider Ricarda Calloway   9/18/2018 9:30 AM MAXIMINO Weaver AND [facility]   9/19/2018 11:30 AM Artur Byrd, PT [facility] AND PT None   9/21/2018 11:00 AM Artur Byrd, PT [facility] AND PT None   9/24/2018 11:00 AM Marlyn Ellington, PT [facility] AND PT None   9/26/2018 11:00 AM Marlyn Del Valle, PT [facility] AND PT None     Meaghan Cervantes BSN  Care Transition Coordinator for ortho bundle  [phone]

## 2018-09-18 ENCOUNTER — APPOINTMENT (OUTPATIENT)
Dept: CT IMAGING | Age: 73
End: 2018-09-18
Payer: MEDICARE

## 2018-09-18 ENCOUNTER — APPOINTMENT (OUTPATIENT)
Dept: GENERAL RADIOLOGY | Age: 73
End: 2018-09-18
Payer: MEDICARE

## 2018-09-18 ENCOUNTER — OFFICE VISIT (OUTPATIENT)
Dept: ORTHOPEDIC SURGERY | Age: 73
End: 2018-09-18

## 2018-09-18 ENCOUNTER — HOSPITAL ENCOUNTER (EMERGENCY)
Age: 73
Discharge: HOME | End: 2018-09-18
Attending: EMERGENCY MEDICINE
Payer: MEDICARE

## 2018-09-18 VITALS
SYSTOLIC BLOOD PRESSURE: 147 MMHG | BODY MASS INDEX: 27.79 KG/M2 | RESPIRATION RATE: 15 BRPM | OXYGEN SATURATION: 100 % | HEIGHT: 62 IN | TEMPERATURE: 98.9 F | WEIGHT: 151 LBS | DIASTOLIC BLOOD PRESSURE: 70 MMHG | HEART RATE: 65 BPM

## 2018-09-18 DIAGNOSIS — I10 ESSENTIAL HYPERTENSION: ICD-10-CM

## 2018-09-18 DIAGNOSIS — R60.0 LOCALIZED EDEMA: ICD-10-CM

## 2018-09-18 DIAGNOSIS — R06.89 DYSPNEA AND RESPIRATORY ABNORMALITIES: Primary | ICD-10-CM

## 2018-09-18 DIAGNOSIS — Z96.652 STATUS POST TOTAL LEFT KNEE REPLACEMENT: Primary | ICD-10-CM

## 2018-09-18 DIAGNOSIS — R06.00 DYSPNEA AND RESPIRATORY ABNORMALITIES: Primary | ICD-10-CM

## 2018-09-18 LAB
A/G RATIO: 1.8 (ref 1.1–2.2)
ALBUMIN SERPL-MCNC: 4.3 G/DL (ref 3.4–5)
ALP BLD-CCNC: 100 U/L (ref 40–129)
ALT SERPL-CCNC: 11 U/L (ref 10–40)
ANION GAP SERPL CALCULATED.3IONS-SCNC: 11 MMOL/L (ref 3–16)
AST SERPL-CCNC: 24 U/L (ref 15–37)
BASOPHILS ABSOLUTE: 0.1 K/UL (ref 0–0.2)
BASOPHILS RELATIVE PERCENT: 1.1 %
BILIRUB SERPL-MCNC: 0.5 MG/DL (ref 0–1)
BUN BLDV-MCNC: 21 MG/DL (ref 7–20)
CALCIUM SERPL-MCNC: 9.6 MG/DL (ref 8.3–10.6)
CHLORIDE BLD-SCNC: 102 MMOL/L (ref 99–110)
CO2: 25 MMOL/L (ref 21–32)
CREAT SERPL-MCNC: <0.5 MG/DL (ref 0.6–1.2)
D DIMER: 1020 NG/ML DDU (ref 0–229)
EOSINOPHILS ABSOLUTE: 0.3 K/UL (ref 0–0.6)
EOSINOPHILS RELATIVE PERCENT: 4 %
GFR AFRICAN AMERICAN: >60
GFR NON-AFRICAN AMERICAN: >60
GLOBULIN: 2.4 G/DL
GLUCOSE BLD-MCNC: 88 MG/DL (ref 70–99)
HCT VFR BLD CALC: 34.2 % (ref 36–48)
HEMOGLOBIN: 11.6 G/DL (ref 12–16)
LYMPHOCYTES ABSOLUTE: 1.1 K/UL (ref 1–5.1)
LYMPHOCYTES RELATIVE PERCENT: 17.8 %
MCH RBC QN AUTO: 31.9 PG (ref 26–34)
MCHC RBC AUTO-ENTMCNC: 33.8 G/DL (ref 31–36)
MCV RBC AUTO: 94.2 FL (ref 80–100)
MONOCYTES ABSOLUTE: 0.5 K/UL (ref 0–1.3)
MONOCYTES RELATIVE PERCENT: 7.9 %
NEUTROPHILS ABSOLUTE: 4.4 K/UL (ref 1.7–7.7)
NEUTROPHILS RELATIVE PERCENT: 69.2 %
PDW BLD-RTO: 14.4 % (ref 12.4–15.4)
PLATELET # BLD: 192 K/UL (ref 135–450)
PMV BLD AUTO: 9.3 FL (ref 5–10.5)
POTASSIUM SERPL-SCNC: 4.1 MMOL/L (ref 3.5–5.1)
PRO-BNP: 277 PG/ML (ref 0–124)
RBC # BLD: 3.63 M/UL (ref 4–5.2)
SODIUM BLD-SCNC: 138 MMOL/L (ref 136–145)
TOTAL PROTEIN: 6.7 G/DL (ref 6.4–8.2)
TROPONIN: <0.01 NG/ML
WBC # BLD: 6.4 K/UL (ref 4–11)

## 2018-09-18 PROCEDURE — 84484 ASSAY OF TROPONIN QUANT: CPT

## 2018-09-18 PROCEDURE — 71046 X-RAY EXAM CHEST 2 VIEWS: CPT

## 2018-09-18 PROCEDURE — 85379 FIBRIN DEGRADATION QUANT: CPT

## 2018-09-18 PROCEDURE — 93971 EXTREMITY STUDY: CPT

## 2018-09-18 PROCEDURE — 99024 POSTOP FOLLOW-UP VISIT: CPT | Performed by: PHYSICIAN ASSISTANT

## 2018-09-18 PROCEDURE — 85025 COMPLETE CBC W/AUTO DIFF WBC: CPT

## 2018-09-18 PROCEDURE — 6360000004 HC RX CONTRAST MEDICATION: Performed by: PHYSICIAN ASSISTANT

## 2018-09-18 PROCEDURE — 83880 ASSAY OF NATRIURETIC PEPTIDE: CPT

## 2018-09-18 PROCEDURE — 93005 ELECTROCARDIOGRAM TRACING: CPT | Performed by: PHYSICIAN ASSISTANT

## 2018-09-18 PROCEDURE — 99285 EMERGENCY DEPT VISIT HI MDM: CPT

## 2018-09-18 PROCEDURE — 80053 COMPREHEN METABOLIC PANEL: CPT

## 2018-09-18 PROCEDURE — 71260 CT THORAX DX C+: CPT

## 2018-09-18 RX ORDER — OXYCODONE HYDROCHLORIDE AND ACETAMINOPHEN 5; 325 MG/1; MG/1
1 TABLET ORAL EVERY 4 HOURS PRN
COMMUNITY
End: 2021-06-17

## 2018-09-18 RX ADMIN — IOPAMIDOL 75 ML: 755 INJECTION, SOLUTION INTRAVENOUS at 16:26

## 2018-09-18 ASSESSMENT — ENCOUNTER SYMPTOMS
ABDOMINAL PAIN: 0
CONSTIPATION: 0
DIARRHEA: 0
SHORTNESS OF BREATH: 1
SORE THROAT: 0
BACK PAIN: 0
NAUSEA: 0
EYE REDNESS: 0
FACIAL SWELLING: 0
COUGH: 0
CHEST TIGHTNESS: 0
RHINORRHEA: 0
EYE PAIN: 0

## 2018-09-18 NOTE — PROGRESS NOTES
History of present illness: The patient returns today after left total knee replacement. Patient is doing well at this time. Pain control. She is participating in physical therapy. Pain Assessment  Location of Pain: Knee  Location Modifiers: Left  Severity of Pain: 3  Frequency of Pain: Intermittent  Aggravating Factors: Walking, Standing  Limiting Behavior: Some  Result of Injury: No  Work-Related Injury: No  Are there other pain locations you wish to document?: No]    Physical examination: The incision is clean, dry, and intact with no drainage or signs of infection. Range of motion is 0 degrees of extension to 110 degrees of flexion. There is increased swelling left lower extremity compared to right.  +1 pitting edema. Negative Homans sign. No calf tenderness. Assessment/plan: We would like to continue outpatient physical therapy to restore range of motion and strength. The patient was given local wound care instructions. We did not refill their pain medication today. We will followup in 3-4 weeks for reevaluation.

## 2018-09-18 NOTE — ED PROVIDER NOTES
Details   oxyCODONE-acetaminophen (PERCOCET) 5-325 MG per tablet Take 1 tablet by mouth every 4 hours as needed for Pain. Chen Gatica Historical Med      aspirin 325 MG EC tablet Take 1 tablet by mouth 2 times daily, Disp-60 tablet, R-0Print      vitamin D (CHOLECALCIFEROL) 1000 UNIT TABS tablet Take 1,000 Units by mouth dailyHistorical Med      MELOXICAM PO Take 15 mg by mouth daily Historical Med      vitamin B-1 (THIAMINE) 100 MG tablet Take 100 mg by mouth daily      Ascorbic Acid (VITAMIN C) 250 MG tablet Take 250 mg by mouth daily      Multiple Vitamin (MULTI-VITAMIN DAILY PO) Take  by mouth. fluoxetine (PROZAC) 40 MG capsule Take 40 mg by mouth nightly Historical Med               ALLERGIES     Indocin [indomethacin]    FAMILY HISTORY       Family History   Problem Relation Age of Onset    Cancer Father         melanoma    Parkinsonism Mother           SOCIAL HISTORY       Social History     Social History    Marital status:      Spouse name: N/A    Number of children: N/A    Years of education: N/A     Social History Main Topics    Smoking status: Never Smoker    Smokeless tobacco: Never Used    Alcohol use No    Drug use: No    Sexual activity: Not Asked     Other Topics Concern    None     Social History Narrative    None       SCREENINGS             PHYSICAL EXAM    (up to 7 for level 4, 8 or more for level 5)     ED Triage Vitals [09/18/18 1339]   BP Temp Temp Source Pulse Resp SpO2 Height Weight   (!) 162/76 98.9 °F (37.2 °C) Tympanic 73 20 100 % 5' 2\" (1.575 m) 151 lb (68.5 kg)       Physical Exam   Constitutional: She is oriented to person, place, and time. She appears well-developed and well-nourished. HENT:   Head: Normocephalic and atraumatic. Nose: Nose normal.   Eyes: Pupils are equal, round, and reactive to light. Right eye exhibits no discharge. Left eye exhibits no discharge. Neck: Normal range of motion. Neck supple.    Cardiovascular: Normal rate, regular rhythm and normal heart sounds. Exam reveals no gallop and no friction rub. No murmur heard. Pulmonary/Chest: Effort normal and breath sounds normal. No respiratory distress. She has no wheezes. She has no rales. Abdominal: Soft. Bowel sounds are normal. She exhibits no distension. There is no tenderness. There is no rebound. Musculoskeletal: Normal range of motion. She exhibits no edema. Neurological: She is alert and oriented to person, place, and time. No cranial nerve deficit. Skin: Skin is warm and dry. She is not diaphoretic. Psychiatric: She has a normal mood and affect. Her behavior is normal.   Nursing note and vitals reviewed.       DIAGNOSTIC RESULTS   LABS:    Labs Reviewed   CBC WITH AUTO DIFFERENTIAL - Abnormal; Notable for the following:        Result Value    RBC 3.63 (*)     Hemoglobin 11.6 (*)     Hematocrit 34.2 (*)     All other components within normal limits    Narrative:     Performed at:  [address]   Phone [phone]   COMPREHENSIVE METABOLIC PANEL - Abnormal; Notable for the following:     BUN 21 (*)     CREATININE <0.5 (*)     All other components within normal limits    Narrative:     Performed at:  [address]   Phone [phone]   D-DIMER, QUANTITATIVE - Abnormal; Notable for the following:     D-Dimer, Quant 1020 (*)     All other components within normal limits    Narrative:     Performed at:  [facility]) [address]   Phone [phone]   BRAIN NATRIURETIC PEPTIDE - Abnormal; Notable for the following:     Pro- (*)     All other components within normal limits    Narrative:     Performed at:  [address]   Phone [phone]   TROPONIN    Narrative:     Performed at:  [address] [place] [address] [facility]  [address],  [place], [place]   Phone [phone]       All other labs were within normal range or not returned as of this dictation. EKG: All EKG's are interpreted by the Emergency Department Physician who either signs or Co-signs this chart in the absence of a cardiologist.  Please see their note for interpretation of EKG. RADIOLOGY:   Non-plain film images such as CT, Ultrasound and MRI are read by the radiologist. Plain radiographic images are visualized and preliminarily interpreted by the  ED Provider with the below findings:        Interpretation per the Radiologist below, if available at the time of this note:    VL Extremity Venous Left   Final Result      CT CHEST PULMONARY EMBOLISM W CONTRAST   Final Result   No evidence of pulmonary embolism or acute pulmonary abnormality. XR CHEST STANDARD (2 VW)   Final Result   Stable appearance of the chest with no acute abnormality. No results found. PROCEDURES   Unless otherwise noted below, none     Procedures    CRITICAL CARE TIME   N/A    CONSULTS:  IP CONSULT TO PRIMARY CARE PROVIDER      EMERGENCY DEPARTMENT COURSE and DIFFERENTIAL DIAGNOSIS/MDM:   Vitals:    Vitals:    09/18/18 1406 09/18/18 1437 09/18/18 1532 09/18/18 1743   BP: (!) 149/70 (!) 154/73 (!) 153/71 (!) 147/70   Pulse: 69 65     Resp: 18 15     Temp:       TempSrc:       SpO2:  100%  100%   Weight:       Height:           Patient was given the following medications:  Medications   iopamidol (ISOVUE-370) 76 % injection 75 mL (75 mLs Intravenous Given 9/18/18 1626)       This patient presented to the emergency department for evaluation of Hypertension and shortness of breath. .       At presentation, vital signs were stable. The patient was in no acute distress. Physical Exam findings were as noted above. Labs were drawn which showed no acute abnormalities including no elevation in white blood cell count.   There was no concerns with her kidney function. A d-dimer was checked and was elevated. Radiographic images of chest were ordered and interpreted by radiology. This showed no acute abnormalities and her heart or chest.. EKG was ordered and interpreted by the attending physician, I reviewed this myself and it showed no concerns for ST deviations or ischemic changes. I discussed these results with the patient and he/she understood    Given her elevation in d-dimer and her recent surgery, I did consult with the patient's primary care provider. They were concerned for possible PE or DVT as the patient was reporting to them that she was feeling short of breath and having hypertension. Venous Doppler ultrasound was repeated and showed no acute DVT. It did show a Baker's cyst again. The patient was aware of this and I did review the results with her. A CT pulmonary angiogram of her pulmonary arteries was therefore warranted to rule out acute pulmonary embolus, this showed no acute pulmonary embolus or other abnormalities in the lungs. Patient that she is likely suffering from hypertension is secondary from her healing process and inflammation. She will continue to monitor blood pressure at home and follow up with her primary care provider for better management of her high blood pressure. This patient will be discharged home with the medications listed below. I counseled on how to take these medicines and risks/benefits and AEs. The patient was agreeable to the prescriptions. He/She will follow up with primary care provider or present back for worsening symptoms. She does have an appointment with her primary care provider and her primary care provider reported that it was appropriate to follow up with her next appointment in 4 weeks.     I estimate there is LOW risk for PULMONARY EMBOLISM, ACUTE CORONARY SYNDROME, THORACIC AORTIC DISSECTION, STROKE, TRANSIENT ISCHEMIC ATTACK, HEMORRHAGE, OR CARDIAC ARRHYTHMIA, thus I

## 2018-09-18 NOTE — ED NOTES
Pt to [address] per stretcher.       Jalyn Maldonado, [facility]  09/18/18 9989

## 2018-09-18 NOTE — ED NOTES
Pt returned from [address] [place] [address] Rd[address].       Kalpesh Samuels RN  09/18/18 8040

## 2018-09-18 NOTE — ED PROVIDER NOTES
Attending Supervisory Note/Shared Visit   I have personally performed a face to face diagnostic evaluation on this patient. I have reviewed the mid-levels findings and agree. History and Exam by me shows:    Determine shortness of breath for one week. Occurs at rest and with exertion. Last for several minutes. Nothing X it better or worse. No orthopnea or PND. Swelling of the left leg after knee surgery a month ago. Swelling is mostly anterior not posterior. No leg pain. No chest pain or pressure. No cough. No wheezing. Patient recently diagnosed with hypertension last month and started on amlodipine 4 days ago. Blood pressure has not decreased    ED Triage Vitals [09/18/18 1339]   BP Temp Temp Source Pulse Resp SpO2 Height Weight   (!) 162/76 98.9 °F (37.2 °C) Tympanic 73 20 100 % 5' 2\" (1.575 m) 151 lb (68.5 kg)     Constitutional: Well appearing and hydrated. Awake & alert. No acute distress. Cardiovascular: Regular rate. Pulmonary/Chest: Clear to auscultation bilaterally . No distress  GI: Soft and non-distended. There is no discomfort with palpation. Extremities: Bilateral surgical incisions the knees. Mild swelling and erythema anterior left knee. No posterior leg or calf tenderness. Neurological: Alert, awake. Moving all extremities  Skin: Skin is warm and dry. Psychiatric: Cooperative. EKG: All EKG's are interpreted by the Emergency Department Physician    EKG:  NSR 60 sinus, no st elevations, no significant change compared prior    MDM:     She presented with intermittent shortness of breath for one week. Some symptoms present for 5 or 6 months. Recent knee surgery and immobilization. Anterior knee swelling. No posterior swelling. Ultrasound negative for DVT. CT negative for PE.  EKG does not show acute ischemia. Troponin negative. Normal vitals except for high blood pressure. Hypertension: He suddenly diagnosed with hypertension started on amlodipine.   No

## 2018-09-19 ENCOUNTER — HOSPITAL ENCOUNTER (OUTPATIENT)
Dept: PHYSICAL THERAPY | Age: 73
Setting detail: THERAPIES SERIES
Discharge: HOME | End: 2018-09-19
Payer: MEDICARE

## 2018-09-19 LAB
EKG ATRIAL RATE: 60 BPM
EKG DIAGNOSIS: NORMAL
EKG P AXIS: -9 DEGREES
EKG P-R INTERVAL: 196 MS
EKG Q-T INTERVAL: 402 MS
EKG QRS DURATION: 78 MS
EKG QTC CALCULATION (BAZETT): 402 MS
EKG R AXIS: -13 DEGREES
EKG T AXIS: 36 DEGREES
EKG VENTRICULAR RATE: 60 BPM

## 2018-09-19 PROCEDURE — 97164 PT RE-EVAL EST PLAN CARE: CPT

## 2018-09-19 PROCEDURE — 97140 MANUAL THERAPY 1/> REGIONS: CPT

## 2018-09-19 PROCEDURE — 97530 THERAPEUTIC ACTIVITIES: CPT

## 2018-09-19 PROCEDURE — 93010 ELECTROCARDIOGRAM REPORT: CPT | Performed by: INTERNAL MEDICINE

## 2018-09-19 NOTE — FLOWSHEET NOTE
(ice, elevation, stockings), drinking more water, follow up with PCP for BP, DVT s/s, anesthesia side effects x15'    Manual Intervention     Pat mobs sup/inf only, HS S, Mod HF S, Grade 1-2 oscillations,  Knee PROM,  20 min                             NMR re-education     Tandem balance L/R LE post 5x10\" Wall prn                                Therapeutic Exercise and NMR EXR  [x] (51566) Provided verbal/tactile cueing for activities related to strengthening, flexibility, endurance, ROM for improvements in LE, proximal hip, and core control with self care, mobility, lifting, ambulation.  [] (16472) Provided verbal/tactile cueing for activities related to improving balance, coordination, kinesthetic sense, posture, motor skill, proprioception  to assist with LE, proximal hip, and core control in self care, mobility, lifting, ambulation and eccentric single leg control.      NMR and Therapeutic Activities:    [] (68271 or 50622) Provided verbal/tactile cueing for activities related to improving balance, coordination, kinesthetic sense, posture, motor skill, proprioception and motor activation to allow for proper function of core, proximal hip and LE with self care and ADLs  [] (68439) Gait Re-education- Provided training and instruction to the patient for proper LE, core and proximal hip recruitment and positioning and eccentric body weight control with ambulation re-education including up and down stairs     Home Exercise Program:    [x] (37850) Reviewed/Progressed HEP activities related to strengthening, flexibility, endurance, ROM of core, proximal hip and LE for functional self-care, mobility, lifting and ambulation/stair navigation   [] (34492)Reviewed/Progressed HEP activities related to improving balance, coordination, kinesthetic sense, posture, motor skill, proprioception of core, proximal hip and LE for self care, mobility, lifting, and ambulation/stair navigation      Manual Treatments:  PROM / STM / for community distances         Progression Towards Functional goals:  [x] Patient is progressing as expected towards functional goals listed. [] Progression is slowed due to complexities listed. [] Progression has been slowed due to co-morbidities. [] Plan just implemented, too soon to assess goals progression  [] Other:     ASSESSMENT: Nancy 4 week Orthovitals testing well, except for the pain and instability of the L ankle did limit patient. Patient had a LOB with no fall during the TUG Test. Discussed use of her ankle brace during ADL's. Treatment/Activity Tolerance:  [x] Patient tolerated treatment well [] Patient limited by fatique  [] Patient limited by pain  [] Patient limited by other medical complications  [] Other:     Prognosis: [x] Good [] Fair  [] Poor    Patient Requires Follow-up: [x] Yes  [] No    PLAN:Continue strengthening, ROM and balance.   [x] Continue per plan of care [] Alter current plan (see comments)  [] Plan of care initiated [] Hold pending MD visit [] Discharge    Electronically signed by: Betty Medina PT

## 2018-09-21 ENCOUNTER — APPOINTMENT (OUTPATIENT)
Dept: PHYSICAL THERAPY | Age: 73
End: 2018-09-21
Payer: MEDICARE

## 2018-09-24 ENCOUNTER — HOSPITAL ENCOUNTER (OUTPATIENT)
Dept: PHYSICAL THERAPY | Age: 73
Setting detail: THERAPIES SERIES
Discharge: HOME | End: 2018-09-24
Payer: MEDICARE

## 2018-09-24 PROCEDURE — G8979 MOBILITY GOAL STATUS: HCPCS

## 2018-09-24 PROCEDURE — 97164 PT RE-EVAL EST PLAN CARE: CPT

## 2018-09-24 PROCEDURE — G8978 MOBILITY CURRENT STATUS: HCPCS

## 2018-09-24 PROCEDURE — 97110 THERAPEUTIC EXERCISES: CPT

## 2018-09-24 NOTE — FLOWSHEET NOTE
[facility], [address]  Phone: [phone]  Fax [phone]      ATHLETIC TRAINING [address]  Date:  2018    Patient Name:  Areli Wang    :  1945  MRN: [id number]  Restrictions/Precautions:    Medical/Treatment Diagnosis Information:   Diagnosis: OA of knee, s/p TKA   Treatment Diagnosis: L knee pn, L knee effusion, L leg muscle weakness, gait abnormality  Physician Information:   Dr. Kathy Ellis Post-op  8 wks  12 wks 16 wks 20 wks   24 wks                            Activity Log                                                  DOS/DOI: 18                                                   Date:  18   ATC communication      Bike      Elliptical      Treadmill      Airdyne            Gastroc stretch      Soleus stretch      Hamstring stretch      ITB stretch      Hip Flexor stretch      Quad stretch      Adductor stretch            Weight Shifting sp                                fp                                tp      Lateral walking (with/w/o TB)            Balance: PEP/Trista board                     SLS            Star excursion load/explode            Extremity reach UE/LE            Leg Press Darrion. 80# 2x12 80# 3x10 80# 3x10                     Ecc.                        Inv. Calf Press Darrion.                          Ecc.                          Inv.            MARLENY   Flex                 ABd 30# R/L 3x10 30# R/L 3x10 45# B 3x10              ADd                TKE 60# R/L 30x5' 30#  45# L 30x5\"              Ext  30# R/L 2x10 30# B 2x12         Steps Up                 Up and Over 4\" R/L 15x ea                Down                 Lateral                 Rotation            Squats  mini                    wall                   [facility]             Lunges:  Lunge to Balance                     Balance to [facility] Knee Extension Bilat. Ecc.                                 Inv. Hamstring Curls Bilat. 30# 2x12 30# 3x10 40# 2x12                              Ecc.                                 Inv.            Soleus Press Bilat. Ecc.                             Inv.                                   Ladders                  Square                 Jump/Hop  Low                        Med.                        High                                                                  Modality      Initials                             EP EP DM   Time spent with PT assistant

## 2018-09-24 NOTE — PLAN OF CARE
[facility], [address]  Phone: [phone]  Fax [phone]     Physical Therapy Re-Certification Plan of Care    Dear  Dr Remy Anguiano,    We had the pleasure of treating the following patient for physical therapy services at [address] [place] [address]. A summary of our findings can be found in the updated assessment below. This includes our plan of care. If you have any questions or concerns regarding these findings, please do not hesitate to contact me at the office phone number checked above. Thank you for the referral.     Physician Signature:________________________________Date:__________________  By signing above, therapists plan is approved by physician      Patient: Areli Wang   : 1945   MRN: [id number]  Referring Physician:  Dr Remy Anguiano      Evaluation Date: 2018      Medical Diagnosis Information:    Medical/Treatment Diagnosis Information:  · Diagnosis: M17.12 (ICD-10-CM) - Primary localized osteoarthritis of left knee, s/p Total knee arthroscopy on 2018  · Treatment Diagnosis: left knee pain M25.562, left knee effusion M25.462, left leg muscle weakness M62.82, gait abnormality 41.8  Insurance/Certification information:  [employer]  · Physician Information:  Dr. Remy Anguiano  ·     Insurance information:      Date Range: 18-18    Total visits: 10      G-Codes: (if applicable) PT G-Codes  Functional Assessment Tool Used: LEFS  Score: 26%  Functional Limitation: Mobility: Walking and moving around  Mobility: Walking and Moving Around Current Status (): At least 20 percent but less than 40 percent impaired, limited or restricted  Mobility: Walking and Moving Around Goal Status (): At least 20 percent but less than 40 percent impaired, limited or restricted   Functional Index used: LEFS    SUBJECTIVE: Patient reports that she feels that her knee is doing well.     Current Goals: To be achieved in: 2 weeks  1. Independent in HEP and progression per patient tolerance, in order to prevent re-injury. 2. Patient will have a decrease in pain to facilitate improvement in movement, function, and ADLs as indicated by Functional Deficits. Long Term Goals: To be achieved in: 12 weeks  1. Disability index score of 28% or less for the LEFS to assist with reaching prior level of function. MET  2. Patient will demonstrate increased PROM to 0-120 deg to allow for proper joint functioning as indicated by patients Functional Deficits. PROGRESSING  3. Patient will demonstrate an increase in Strength to good proximal hip strength and control, within 5lb HHD in LE to allow for proper functional mobility as indicated by patients Functional Deficits. PROGRESSING  4. Patient will return to ADL functional activities without increased symptoms or restriction. PROGRESSING  5. Pt will return to normal ambulation without assistive devices for community distances   PROGRESSING    Rehab Potential:   []Excellent   [x] Good   [] Fair   [] Poor    Plan of Care:  [x] Continue Current Therapy Intervention    Frequency/Duration:  2 days per week for 4 Weeks:  HEP instruction:   1. Ther ex including: strength training, ROM, NMR and proprioception for the proximal hip, core and Lower extremity  2. Manual therapy as indicated including Dry Needling/IASTM, STM, PROM, Gr I-IV mobilizations, spinal mobilization/manipulation. 3. Modalities as needed including: thermal agents, E-stim, US, iontophoresis as indicated. 4. Patient education on joint protection, activity modification, progression of HEP.         Electronically signed by:  Brenda Becerril PT

## 2018-09-24 NOTE — FLOWSHEET NOTE
[facility], [address] [place] [address] Th[place]  Phone: [phone]  Fax [phone]        Physical Therapy Daily Treatment Note  Date:  2018    Patient Name:  Annamaria Mejia    :  1945  MRN: [id number]  Restrictions/Precautions:    Medical/Treatment Diagnosis Information:  Diagnosis: M17.12 (ICD-10-CM) - Primary localized osteoarthritis of left knee, s/p Total knee arthroscopy on 2018  Treatment Diagnosis: left knee pain M25.562, left knee effusion M25.462, left leg muscle weakness M62.82, gait abnormality 64.8  Insurance/Certification information:  [facility]  Physician Information:  Dr. Dalia An of care signed (Y/N):     Date of Patient follow up with Physician: 18    G-Code (if applicable):      Date G-Code Applied:    PT G-Codes  Functional Assessment Tool Used: LEFS  Score: 26%  Functional Limitation: Mobility: Walking and moving around  Mobility: Walking and Moving Around Current Status (): At least 20 percent but less than 40 percent impaired, limited or restricted  Mobility: Walking and Moving Around Goal Status (): At least 20 percent but less than 40 percent impaired, limited or restricted    Progress Note: []  Yes  [x]  No  Next due by: Visit #10       Latex Allergy:  [x]NO      []YES  Preferred Language for Healthcare:   [x]English       []other:    Visit # Insurance Allowable Requires auth   8 [facility]    [x]no        []yes:     POC thru 10/24/18  LEFS 26%    Pain level:  3/10     SUBJECTIVE: Patient reports that her knee is doing well. She does say her ankle is bad and that effects her L side.     OBJECTIVE: SEE POC NOTE   Test measurements:  AAROM 0-120;     RESTRICTIONS/PRECAUTIONS: none noted    Exercises/Interventions:     Therapeutic Ex Sets/sec Notes   gastroc stretches with strap hep   Quad sets hep   heelslides for flexion 10 sec 10xhep   SLR HEP   Long Sit HS S HEP   SAQ 5# 3x10    Bridges w/

## 2018-09-25 ENCOUNTER — CARE COORDINATION (OUTPATIENT)
Dept: CASE MANAGEMENT | Age: 73
End: 2018-09-25

## 2018-09-26 ENCOUNTER — HOSPITAL ENCOUNTER (OUTPATIENT)
Dept: PHYSICAL THERAPY | Age: 73
Setting detail: THERAPIES SERIES
Discharge: HOME | End: 2018-09-26
Payer: MEDICARE

## 2018-09-26 PROCEDURE — 97140 MANUAL THERAPY 1/> REGIONS: CPT

## 2018-09-26 PROCEDURE — 97112 NEUROMUSCULAR REEDUCATION: CPT

## 2018-09-26 PROCEDURE — 97110 THERAPEUTIC EXERCISES: CPT

## 2018-09-26 NOTE — FLOWSHEET NOTE
elevation, stockings), drinking more water, follow up with PCP for BP, DVT s/s, anesthesia side effects x15'    Manual Intervention     Pat mobs sup/inf only, CFM to incision. HS S, Mod HF S, Grade 1-2 oscillations,  Knee PROM,  25 min                             NMR re-education     Tandem balance L/R LE post 5x10\" Wall prn   Tandem Balance R/L rearfoot 10 sec x 10 each On Air-Ex with min A to keep COG   FSU 4 in 2 x 10     FSD 8 in 2 x 10 L/R up/down    Chair Squats 3 x 10 Air-Ex            Therapeutic Exercise and NMR EXR  [x] (82532) Provided verbal/tactile cueing for activities related to strengthening, flexibility, endurance, ROM for improvements in LE, proximal hip, and core control with self care, mobility, lifting, ambulation.  [] (84727) Provided verbal/tactile cueing for activities related to improving balance, coordination, kinesthetic sense, posture, motor skill, proprioception  to assist with LE, proximal hip, and core control in self care, mobility, lifting, ambulation and eccentric single leg control.      NMR and Therapeutic Activities:    [] (96433 or 43455) Provided verbal/tactile cueing for activities related to improving balance, coordination, kinesthetic sense, posture, motor skill, proprioception and motor activation to allow for proper function of core, proximal hip and LE with self care and ADLs  [] (80910) Gait Re-education- Provided training and instruction to the patient for proper LE, core and proximal hip recruitment and positioning and eccentric body weight control with ambulation re-education including up and down stairs     Home Exercise Program:    [x] (79894) Reviewed/Progressed HEP activities related to strengthening, flexibility, endurance, ROM of core, proximal hip and LE for functional self-care, mobility, lifting and ambulation/stair navigation   [] (89664)Reviewed/Progressed HEP activities related to improving balance, coordination, kinesthetic sense, posture, motor skill,

## 2018-10-02 ENCOUNTER — CARE COORDINATION (OUTPATIENT)
Dept: CASE MANAGEMENT | Age: 73
End: 2018-10-02

## 2018-10-02 NOTE — CARE COORDINATION
Spoke with patient. States she is doing well. Incision status: States free from redness or drainage. Edema/Swelling: States swelling has eimproved. Pain level and status: States pain is tolerable. Use of pain medications: States not taking pain meds. Bowels: No complaints. Outpatient therapy: States continues twice a week.     Do you have all of your medications: Yes    Changes in medications: No    Follow up appointments:    Future Appointments  Date Time Provider Ricarda Elli   10/4/2018 2:00 PM Faina Whitlock,  [address] PT None     Tawanna Birmingham BSN  Care Transition Coordinator for ortho bundle  [phone]

## 2018-10-04 ENCOUNTER — HOSPITAL ENCOUNTER (OUTPATIENT)
Dept: PHYSICAL THERAPY | Age: 73
Setting detail: THERAPIES SERIES
Discharge: HOME | End: 2018-10-04
Payer: MEDICARE

## 2018-10-08 ENCOUNTER — TELEPHONE (OUTPATIENT)
Dept: ORTHOPEDIC SURGERY | Age: 73
End: 2018-10-08

## 2018-10-09 ENCOUNTER — CARE COORDINATION (OUTPATIENT)
Dept: CASE MANAGEMENT | Age: 73
End: 2018-10-09

## 2018-10-16 ENCOUNTER — CARE COORDINATION (OUTPATIENT)
Dept: CASE MANAGEMENT | Age: 73
End: 2018-10-16

## 2018-10-31 ENCOUNTER — CARE COORDINATION (OUTPATIENT)
Dept: CASE MANAGEMENT | Age: 73
End: 2018-10-31

## 2018-10-31 NOTE — CARE COORDINATION
Spoke with patient. Incision status: States free from redness or drainage. Edema/Swelling: States not having swelling, ankle has improved. Pain level and status: States not having pain. Use of pain medications: States not taking pain meds. Bowels: no complaints. Do you have all of your medications: Yes    Changes in medications: No    Follow up appointments:    No future appointments.   Venice Mathias BSN  Care Transition Coordinator for ortho bundle  [phone]

## 2018-11-15 ENCOUNTER — CARE COORDINATION (OUTPATIENT)
Dept: CASE MANAGEMENT | Age: 73
End: 2018-11-15

## 2020-05-28 NOTE — PROGRESS NOTES
Chief Complaint    No chief complaint on file. History of Present Illness:  Cathi Dorman is a 77 y.o. femalehere for evaluation chief complaint of left ankle pain. 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 States she has had both of her knees replaced and would like to discuss options for her ankle. Currently rates her pain at 8 out of 10. Walking makes it worse sitting makes it better. She is taking meloxicam she is not working at this time. Medical History:  Patient's medications, allergies, past medical, surgical, social and family histories were reviewed and updated as appropriate. Review of Systems:  Pertinent items are noted in HPI  Review of systems reviewed from Patient History Form dated on 5/29/2020 and available in the patient's chart under the Media tab. Vital Signs: There were no vitals taken for this visit. General Exam:   Constitutional: Patient is adequately groomed with no evidence of malnutrition  DTRs: Deep tendon reflexes are intact  Mental Status: The patient is oriented to time, place and person. The patient's mood and affect are appropriate. Lymphatic: The lymphatic examination bilaterally reveals all areas to be without enlargement or induration. Foot Examination:    Inspection: Obvious prominence of the lateral aspect of the left hindfoot    Palpation: Rock Sides in the left sinus Tarsi    Range of Motion: 10 degrees dorsiflexion 40 degrees of plantarflexion    Strength: 4/5 throughout no focal weakness    Special Tests: Pain with mobilization through the subtalar joint    Skin: There are no rashes, ulcerations or lesions. Gait: Antalgic    Reflex 2+ and symmetric    Additional Comments:       Additional Examinations:         Right Lower Extremity: Examination of the right lower extremity does not show any tenderness, deformity or injury. Range of motion is unremarkable. There is no gross instability. There are no rashes, ulcerations or lesions.

## 2020-05-29 ENCOUNTER — OFFICE VISIT (OUTPATIENT)
Dept: ORTHOPEDIC SURGERY | Age: 75
End: 2020-05-29
Payer: MEDICARE

## 2020-05-29 VITALS — HEIGHT: 62 IN | BODY MASS INDEX: 27.79 KG/M2 | WEIGHT: 151.01 LBS

## 2020-05-29 PROCEDURE — 3017F COLORECTAL CA SCREEN DOC REV: CPT | Performed by: ORTHOPAEDIC SURGERY

## 2020-05-29 PROCEDURE — G8427 DOCREV CUR MEDS BY ELIG CLIN: HCPCS | Performed by: ORTHOPAEDIC SURGERY

## 2020-05-29 PROCEDURE — 20605 DRAIN/INJ JOINT/BURSA W/O US: CPT | Performed by: ORTHOPAEDIC SURGERY

## 2020-05-29 PROCEDURE — 1123F ACP DISCUSS/DSCN MKR DOCD: CPT | Performed by: ORTHOPAEDIC SURGERY

## 2020-05-29 PROCEDURE — 4040F PNEUMOC VAC/ADMIN/RCVD: CPT | Performed by: ORTHOPAEDIC SURGERY

## 2020-05-29 PROCEDURE — G8417 CALC BMI ABV UP PARAM F/U: HCPCS | Performed by: ORTHOPAEDIC SURGERY

## 2020-05-29 PROCEDURE — 1036F TOBACCO NON-USER: CPT | Performed by: ORTHOPAEDIC SURGERY

## 2020-05-29 PROCEDURE — 99213 OFFICE O/P EST LOW 20 MIN: CPT | Performed by: ORTHOPAEDIC SURGERY

## 2020-05-29 PROCEDURE — 1090F PRES/ABSN URINE INCON ASSESS: CPT | Performed by: ORTHOPAEDIC SURGERY

## 2020-05-29 PROCEDURE — G8400 PT W/DXA NO RESULTS DOC: HCPCS | Performed by: ORTHOPAEDIC SURGERY

## 2020-05-29 RX ORDER — LIDOCAINE HYDROCHLORIDE 10 MG/ML
4 INJECTION, SOLUTION INFILTRATION; PERINEURAL ONCE
Status: COMPLETED | OUTPATIENT
Start: 2020-05-29 | End: 2020-05-29

## 2020-05-29 RX ORDER — BUPIVACAINE HYDROCHLORIDE 5 MG/ML
4 INJECTION, SOLUTION PERINEURAL ONCE
Status: COMPLETED | OUTPATIENT
Start: 2020-05-29 | End: 2020-05-29

## 2020-05-29 RX ORDER — TRIAMCINOLONE ACETONIDE 40 MG/ML
80 INJECTION, SUSPENSION INTRA-ARTICULAR; INTRAMUSCULAR ONCE
Status: COMPLETED | OUTPATIENT
Start: 2020-05-29 | End: 2020-05-29

## 2020-05-29 RX ADMIN — LIDOCAINE HYDROCHLORIDE 4 ML: 10 INJECTION, SOLUTION INFILTRATION; PERINEURAL at 09:46

## 2020-05-29 RX ADMIN — BUPIVACAINE HYDROCHLORIDE 20 MG: 5 INJECTION, SOLUTION PERINEURAL at 09:45

## 2020-05-29 RX ADMIN — TRIAMCINOLONE ACETONIDE 80 MG: 40 INJECTION, SUSPENSION INTRA-ARTICULAR; INTRAMUSCULAR at 09:46

## 2020-12-09 ENCOUNTER — OFFICE VISIT (OUTPATIENT)
Dept: ORTHOPEDIC SURGERY | Age: 75
End: 2020-12-09
Payer: MEDICARE

## 2020-12-09 VITALS — WEIGHT: 151.01 LBS | HEIGHT: 62 IN | BODY MASS INDEX: 27.79 KG/M2

## 2020-12-09 PROCEDURE — G8484 FLU IMMUNIZE NO ADMIN: HCPCS | Performed by: ORTHOPAEDIC SURGERY

## 2020-12-09 PROCEDURE — 1036F TOBACCO NON-USER: CPT | Performed by: ORTHOPAEDIC SURGERY

## 2020-12-09 PROCEDURE — 1123F ACP DISCUSS/DSCN MKR DOCD: CPT | Performed by: ORTHOPAEDIC SURGERY

## 2020-12-09 PROCEDURE — 4040F PNEUMOC VAC/ADMIN/RCVD: CPT | Performed by: ORTHOPAEDIC SURGERY

## 2020-12-09 PROCEDURE — G8417 CALC BMI ABV UP PARAM F/U: HCPCS | Performed by: ORTHOPAEDIC SURGERY

## 2020-12-09 PROCEDURE — 3017F COLORECTAL CA SCREEN DOC REV: CPT | Performed by: ORTHOPAEDIC SURGERY

## 2020-12-09 PROCEDURE — 20605 DRAIN/INJ JOINT/BURSA W/O US: CPT | Performed by: ORTHOPAEDIC SURGERY

## 2020-12-09 PROCEDURE — G8427 DOCREV CUR MEDS BY ELIG CLIN: HCPCS | Performed by: ORTHOPAEDIC SURGERY

## 2020-12-09 PROCEDURE — G8400 PT W/DXA NO RESULTS DOC: HCPCS | Performed by: ORTHOPAEDIC SURGERY

## 2020-12-09 PROCEDURE — 1090F PRES/ABSN URINE INCON ASSESS: CPT | Performed by: ORTHOPAEDIC SURGERY

## 2020-12-09 PROCEDURE — 99212 OFFICE O/P EST SF 10 MIN: CPT | Performed by: ORTHOPAEDIC SURGERY

## 2020-12-09 RX ORDER — LIDOCAINE HYDROCHLORIDE 10 MG/ML
4 INJECTION, SOLUTION INFILTRATION; PERINEURAL ONCE
Status: COMPLETED | OUTPATIENT
Start: 2020-12-09 | End: 2020-12-09

## 2020-12-09 RX ORDER — BUPIVACAINE HYDROCHLORIDE 5 MG/ML
4 INJECTION, SOLUTION PERINEURAL ONCE
Status: COMPLETED | OUTPATIENT
Start: 2020-12-09 | End: 2020-12-09

## 2020-12-09 RX ORDER — TRIAMCINOLONE ACETONIDE 40 MG/ML
80 INJECTION, SUSPENSION INTRA-ARTICULAR; INTRAMUSCULAR ONCE
Status: COMPLETED | OUTPATIENT
Start: 2020-12-09 | End: 2020-12-09

## 2020-12-09 RX ADMIN — LIDOCAINE HYDROCHLORIDE 4 ML: 10 INJECTION, SOLUTION INFILTRATION; PERINEURAL at 15:19

## 2020-12-09 RX ADMIN — BUPIVACAINE HYDROCHLORIDE 20 MG: 5 INJECTION, SOLUTION PERINEURAL at 15:19

## 2020-12-09 RX ADMIN — TRIAMCINOLONE ACETONIDE 80 MG: 40 INJECTION, SUSPENSION INTRA-ARTICULAR; INTRAMUSCULAR at 15:20

## 2020-12-16 ENCOUNTER — OFFICE VISIT (OUTPATIENT)
Dept: ORTHOPEDIC SURGERY | Age: 75
End: 2020-12-16
Payer: MEDICARE

## 2020-12-16 VITALS — BODY MASS INDEX: 27.79 KG/M2 | HEIGHT: 62 IN | WEIGHT: 151 LBS

## 2020-12-16 PROCEDURE — G8427 DOCREV CUR MEDS BY ELIG CLIN: HCPCS | Performed by: ORTHOPAEDIC SURGERY

## 2020-12-16 PROCEDURE — G8484 FLU IMMUNIZE NO ADMIN: HCPCS | Performed by: ORTHOPAEDIC SURGERY

## 2020-12-16 PROCEDURE — 3017F COLORECTAL CA SCREEN DOC REV: CPT | Performed by: ORTHOPAEDIC SURGERY

## 2020-12-16 PROCEDURE — 99212 OFFICE O/P EST SF 10 MIN: CPT | Performed by: ORTHOPAEDIC SURGERY

## 2020-12-16 PROCEDURE — G8400 PT W/DXA NO RESULTS DOC: HCPCS | Performed by: ORTHOPAEDIC SURGERY

## 2020-12-16 PROCEDURE — 1036F TOBACCO NON-USER: CPT | Performed by: ORTHOPAEDIC SURGERY

## 2020-12-16 PROCEDURE — 20605 DRAIN/INJ JOINT/BURSA W/O US: CPT | Performed by: ORTHOPAEDIC SURGERY

## 2020-12-16 PROCEDURE — 4040F PNEUMOC VAC/ADMIN/RCVD: CPT | Performed by: ORTHOPAEDIC SURGERY

## 2020-12-16 PROCEDURE — G8417 CALC BMI ABV UP PARAM F/U: HCPCS | Performed by: ORTHOPAEDIC SURGERY

## 2020-12-16 PROCEDURE — 1090F PRES/ABSN URINE INCON ASSESS: CPT | Performed by: ORTHOPAEDIC SURGERY

## 2020-12-16 PROCEDURE — 1123F ACP DISCUSS/DSCN MKR DOCD: CPT | Performed by: ORTHOPAEDIC SURGERY

## 2020-12-16 RX ORDER — TRIAMCINOLONE ACETONIDE 40 MG/ML
80 INJECTION, SUSPENSION INTRA-ARTICULAR; INTRAMUSCULAR ONCE
Status: COMPLETED | OUTPATIENT
Start: 2020-12-16 | End: 2020-12-16

## 2020-12-16 RX ADMIN — TRIAMCINOLONE ACETONIDE 80 MG: 40 INJECTION, SUSPENSION INTRA-ARTICULAR; INTRAMUSCULAR at 10:02

## 2021-06-17 ENCOUNTER — INITIAL CONSULT (OUTPATIENT)
Dept: NEUROLOGY | Age: 76
End: 2021-06-17
Payer: MEDICARE

## 2021-06-17 ENCOUNTER — HOSPITAL ENCOUNTER (OUTPATIENT)
Age: 76
Discharge: HOME | End: 2021-06-17
Payer: MEDICARE

## 2021-06-17 VITALS
DIASTOLIC BLOOD PRESSURE: 76 MMHG | HEART RATE: 72 BPM | SYSTOLIC BLOOD PRESSURE: 152 MMHG | HEIGHT: 62 IN | BODY MASS INDEX: 24.66 KG/M2 | WEIGHT: 134 LBS | OXYGEN SATURATION: 98 %

## 2021-06-17 DIAGNOSIS — G25.0 ESSENTIAL TREMOR: ICD-10-CM

## 2021-06-17 DIAGNOSIS — F02.80 DEMENTIA DUE TO ALZHEIMER'S DISEASE (HCC): ICD-10-CM

## 2021-06-17 DIAGNOSIS — R41.3 MEMORY LOSS: ICD-10-CM

## 2021-06-17 DIAGNOSIS — G30.9 DEMENTIA DUE TO ALZHEIMER'S DISEASE (HCC): ICD-10-CM

## 2021-06-17 DIAGNOSIS — R41.3 MEMORY LOSS: Primary | ICD-10-CM

## 2021-06-17 LAB
FOLATE: 12.07 NG/ML (ref 4.78–24.2)
TSH SERPL DL<=0.05 MIU/L-ACNC: 2.25 UIU/ML (ref 0.27–4.2)
VITAMIN B-12: 471 PG/ML (ref 211–911)

## 2021-06-17 PROCEDURE — 4040F PNEUMOC VAC/ADMIN/RCVD: CPT | Performed by: PSYCHIATRY & NEUROLOGY

## 2021-06-17 PROCEDURE — G8420 CALC BMI NORM PARAMETERS: HCPCS | Performed by: PSYCHIATRY & NEUROLOGY

## 2021-06-17 PROCEDURE — 82746 ASSAY OF FOLIC ACID SERUM: CPT

## 2021-06-17 PROCEDURE — 99204 OFFICE O/P NEW MOD 45 MIN: CPT | Performed by: PSYCHIATRY & NEUROLOGY

## 2021-06-17 PROCEDURE — 82607 VITAMIN B-12: CPT

## 2021-06-17 PROCEDURE — G8427 DOCREV CUR MEDS BY ELIG CLIN: HCPCS | Performed by: PSYCHIATRY & NEUROLOGY

## 2021-06-17 PROCEDURE — 1036F TOBACCO NON-USER: CPT | Performed by: PSYCHIATRY & NEUROLOGY

## 2021-06-17 PROCEDURE — 84443 ASSAY THYROID STIM HORMONE: CPT

## 2021-06-17 PROCEDURE — G8400 PT W/DXA NO RESULTS DOC: HCPCS | Performed by: PSYCHIATRY & NEUROLOGY

## 2021-06-17 PROCEDURE — 1123F ACP DISCUSS/DSCN MKR DOCD: CPT | Performed by: PSYCHIATRY & NEUROLOGY

## 2021-06-17 PROCEDURE — 3017F COLORECTAL CA SCREEN DOC REV: CPT | Performed by: PSYCHIATRY & NEUROLOGY

## 2021-06-17 PROCEDURE — 1090F PRES/ABSN URINE INCON ASSESS: CPT | Performed by: PSYCHIATRY & NEUROLOGY

## 2021-06-17 PROCEDURE — 36415 COLL VENOUS BLD VENIPUNCTURE: CPT

## 2021-06-17 RX ORDER — PRIMIDONE 50 MG/1
TABLET ORAL
COMMUNITY
Start: 2021-06-14 | End: 2021-09-09 | Stop reason: SDUPTHER

## 2021-06-17 RX ORDER — MEMANTINE HYDROCHLORIDE 14 MG/1
14 CAPSULE, EXTENDED RELEASE ORAL DAILY
Qty: 30 CAPSULE | Refills: 2 | Status: SHIPPED | OUTPATIENT
Start: 2021-06-17 | End: 2021-09-09 | Stop reason: ALTCHOICE

## 2021-06-17 RX ORDER — ASPIRIN 81 MG/1
81 TABLET ORAL DAILY
COMMUNITY

## 2021-06-17 RX ORDER — PREDNISONE 20 MG/1
TABLET ORAL
COMMUNITY
Start: 2021-04-14

## 2021-06-17 RX ORDER — LATANOPROST 50 UG/ML
SOLUTION/ DROPS OPHTHALMIC
COMMUNITY
Start: 2021-05-12

## 2021-06-17 RX ORDER — DONEPEZIL HYDROCHLORIDE 10 MG/1
TABLET, FILM COATED ORAL
COMMUNITY
Start: 2021-06-14 | End: 2021-09-09 | Stop reason: SDUPTHER

## 2021-06-17 RX ORDER — PANTOPRAZOLE SODIUM 20 MG/1
20 TABLET, DELAYED RELEASE ORAL DAILY
COMMUNITY
Start: 2021-03-23

## 2021-06-17 NOTE — PROGRESS NOTES
The patient is a 77y.o. years old female who  was referred by Jose Enrique Urbina MD for consultation regarding chronic cognitive impairment and possible dementia. HPI:  The patient came today with her daughter. She described gradual and progressive decline in her memory nearly short-term over the last year or so. Description is daily forgetfulness and needing prompting her family or friends. She will forget details of recent conversation or misplaces items at home. She was with her  today. She continues to drive locally. No recent MVA or getting lost in direction. She is able to care for ADL and her finances. No major mistakes with her billing. She denies any hallucination or psychosis. No major depression but occasional anxiety. She denies any sleep issues or major insomnia. No recent or recurrent falling. No fever or chills. She has no fall with her long-term memory. She does have problem with speech including verbal speech and vocabulary. She gets embarrassed at the shopping store when she cannot remember name of certain fruits or grocery. Last MRI from March of last year showed diffuse atrophy. Not able to locate to recent blood testing. She was started on Aricept few weeks ago. No side effect from Aricept. She denies any history of stroke, head trauma or bad habits. She has history of chronic hand tremors. More with positioning or intentional.  No resting tremors. Degree is moderate. Duration is minutes. Onset was several months ago. She is currently on Mysoline 50 mg twice daily. No side effect from Mysoline. She denies any resting tremors or rigidity or stiffness. Other review of system was unremarkable.           ROS : A 10-14 system review of constitutional, cardiovascular, respiratory, GI, eyes, , ENT, musculoskeletal, endocrine, skin, SHEENT, genitourinary, psychiatric and neurologic systems was obtained and updated today and is unremarkable except as mentioned in my HPI        Exam:   Constitutional:   Vitals:    06/17/21 1159   BP: (!) 152/76   Pulse: 72   SpO2: 98%   Weight: 134 lb (60.8 kg)   Height: 5' 2\" (1.575 m)       General appearance:  Normal development and appear in no acute distress. Mental Status: MMSE 20/30  Oriented to person, place, problem, and time. Memory: Good immediate recall. Intact remote memory  Normal attention span and concentration. Language: intact naming, repeating and fluency   Good fund of Knowledge. Aware of current events and vocabulary   Cranial Nerves:   II: Visual fields: Full. Pupils: equal, round, reactive to light  III,IV,VI: Extra Ocular Movements are intact. No nystagmus  V: Facial sensation is intact  VII: Facial strength and movements: intact and symmetric  XII: Tongue movements are normal  Musculoskeletal: 5/5 in all 4 extremities. Tone: Normal tone. Reflexes: Symmetric 2+ in the arms and 2+ in the legs   Coordination: no pronator drift, no dysmetria with FNF and normal REM. Postural and intentional hand tremors. No resting tremors. No cogwheeling. Sensation: normal to all modalities in both arms and legs. Gait/Posture: steady gait and normal posturing and station. Medical decision making:  I personally reviewed and updated social history, past medical history, medications, allergy, surgical history, and family history as documented in the patient's electronic health records. Labs and/or neuroimaging and other test results reviewed and discussed with the patient. Reviewed notes from other physicians. Provided patient education regarding risk, benefits and treatment options as well as adherence to medication regimen and side effect from these medications. Obtained history from her daughter      Assessment:  1. Memory loss and chronic cognitive impairment. Likely dementia could be Alzheimer disease, moderate.     Check B12, TSH and folate with next blood testing  No need to repeat MRI brain at this point  Continue Aricept 10 mg daily  Add Namenda XR 14 mg daily  Discussed side effect of Aricept and Namenda    Lengthy discussion with the patient and her daughter regarding outcome from dementia, possible differential, cognitive and behavioral therapy and biofeedback  Discussed at length driving precautions and restriction  Multivitamin daily  Continue current cognitive treatment at home  Encouraged weekly exercise and walking  Continue SSRI  Speech therapy for language dysfunction      - Vitamin B12 & Folate; Future  - TSH without Reflex; Future  - External Referral To Speech Therapy      - memantine ER (NAMENDA XR) 14 MG CP24 extended release capsule; Take 1 capsule by mouth daily  Dispense: 30 capsule; Refill: 2  - Vitamin B12 & Folate; Future    3. Essential tremor, not controlled.   Less likely Parkinson or Parkinsonian tremors    Continue Mysoline 50 mg twice daily  Watch for sedation    Follow-up 3-month

## 2021-07-01 ENCOUNTER — HOSPITAL ENCOUNTER (EMERGENCY)
Age: 76
Discharge: HOME | End: 2021-07-01
Attending: STUDENT IN AN ORGANIZED HEALTH CARE EDUCATION/TRAINING PROGRAM
Payer: MEDICARE

## 2021-07-01 ENCOUNTER — HOSPITAL ENCOUNTER (OUTPATIENT)
Dept: SPEECH THERAPY | Age: 76
Setting detail: THERAPIES SERIES
Discharge: HOME | End: 2021-07-01
Payer: MEDICARE

## 2021-07-01 VITALS
HEART RATE: 63 BPM | SYSTOLIC BLOOD PRESSURE: 179 MMHG | RESPIRATION RATE: 16 BRPM | OXYGEN SATURATION: 100 % | DIASTOLIC BLOOD PRESSURE: 70 MMHG | TEMPERATURE: 97.9 F

## 2021-07-01 DIAGNOSIS — Z00.8 ENCOUNTER FOR PSYCHOLOGICAL EVALUATION: Primary | ICD-10-CM

## 2021-07-01 PROCEDURE — 99284 EMERGENCY DEPT VISIT MOD MDM: CPT

## 2021-07-01 PROCEDURE — 92523 SPEECH SOUND LANG COMPREHEN: CPT

## 2021-07-01 ASSESSMENT — ENCOUNTER SYMPTOMS
VOMITING: 0
NAUSEA: 0
STRIDOR: 0
SORE THROAT: 0
BACK PAIN: 0
RHINORRHEA: 0
COUGH: 0
SHORTNESS OF BREATH: 0
PHOTOPHOBIA: 0
ABDOMINAL PAIN: 0

## 2021-07-01 NOTE — ED NOTES
Dr. Mirela Coker aware of high blood pressure, no new orders. , okay to discharge but needs to follow up with PCP. Patient educated on importance of monitoring blood pressure, she said she will do so at home, and also educated on scheduling with her PCP ASAP for reevaluation. Patient verbalized understanding. Patient also educated on signs and symptoms of high blood pressure and when to return to the ER, patient verbalized understanding.       Dayan Olson RN  07/01/21 2005 Shari Cedeño RN  07/01/21 2675
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
## 2021-07-01 NOTE — PROGRESS NOTES
Speech Language Pathology  Facility/Department: [facility] SPEECH THERAPY  Initial Assessment    NAME: Juan A Kim  : 1945  MRN: [id number]    Date of Eval: 2021  Evaluating Therapist: Abhinav Krik        Past Medical History: has a past medical history of Allergic rhinitis, Anxiety, Arthritis, Back pain, Cancer ([address] Depression, Hypertension, Meniere's disease, and Scoliosis. Past Surgical History:  has a past surgical history that includes back surgery; Cholecystectomy; Colonoscopy (3/25/13); Carpal tunnel release; Total knee arthroplasty (Right, 2018); joint replacement; and pr total knee arthroplasty (Left, 2018). Primary Complaint: The patient is a 77 y.o. female who was referred for OP SLP by neurologist. She complains of word finding difficulty and short-term memory loss. She was recently dx with dementia. Pt reports she is also experiencing double vision, I.e., two of her grandchildren were running towards her and she saw four and was unable to tell which ones were real. She also reported she is experiencing hallucinations, I.e., she reported seeing a small man dancing on top of her flag pole last night, and also saw square-shaped boxes floating all around the walls and ceiling and she could not find her way out of the room. She does however report that when experiencing these hallucinations, she knows they are not real. Pt lives with 80-year old  and dog, for which she is primary caretaker of, and does all household chores and house upkeep. One of the pt's primary concerns is her dx will inhibit her from properly taking care of her  and keeping up with her household duties. Onset Date: 21    Pain: The patient does not complain of pain     Assessment:  Cognitive Diagnosis: Mild-Mod cognitive deficit  The SLP administered the SLUMS Examination. The pt scored 16/30, which placed her in the \"Dementia\" category.  She presented with difficulties in problem-solving, memory-recall, reasoning, and executive functioning tasks. The SLP also administered the [address] Test (MAST) on the patient in which the pt scored 90/100 indicating she does not have Expressive or Receptive Aphasia. Due to dx of dementia, pt complaints and concerns, score on SLUMS indicating she is in the dementia range, and subjective assessment during pt interview the pt does require skilled SLP services. Of note, with consideration for worsening memory, BUE tremors, new onset hallucinations, and double vision, which strongly recommend head imaging, as chart indicates no recent MRI or head CT. Subjective:   Previous level of function and limitations: Independent  General  Chart Reviewed: Yes  Patient assessed for rehabilitation services?: Yes  Family / Caregiver Present: No  General Comment  Comments: Chart reviewed prior to completion of assessment  Visit Information  Onset Date: 06/17/21  SLP Insurance Information: Medicare  Subjective  Subjective: Pt was pleasant and cooperative, tearful at times. See additional information in eval regarding pt's response on intake questionnaire. Vision  Vision: Impaired  Vision Exceptions: Wears glasses at all times  Hearing  Hearing: Exceptions to [facility]  Hearing Exceptions: Bilateral hearing aid     Objective:  Oral/Motor  Oral Motor: Within functional limits  Auditory Comprehension  Comprehension: Within Functional Limits     Expression  Primary Mode of Expression: Verbal  Verbal Expression  Verbal Expression: Within functional limits     Motor Speech  Motor Speech: Within Functional Limits    Cognition  Orientation  Overall Orientation Status: Impaired  Orientation Level: Disoriented to time  Attention  Attention: Within Functional Limits  Memory  Memory: Exceptions to [facility]  Daily Routines: To be assessed in therapy  Prospective Memory: Mild  Short-term Memory:  Moderate  Working Memory: Mild  Problem Solving  Problem Solving: Exceptions to [facility]  Complex Functional Tasks: Mild  Managing Finances: To be assessed in therapy  Managing Medications: To be assessed in therapy  Verbal Reasoning Skills: Mild  Numeric Reasoning  Numeric Reasoning: Exceptions to [facility]   Calculations: Moderate  Money Management: To be assessed in therapy  Abstract Reasoning  Abstract Reasoning: Exceptions to [facility]  Convergent Thinking: Moderate  Divergent Thinking: Moderate  Safety/Judgement  Safety/Judgement: Exceptions to [facility]  Complex Functional Tasks: Mild    Additional Assessments: N/A     Plan:    Goals:   Short-term Goals  Timeframe for Short-term Goals: 6 wks (8/12/2021)  Goal 1: Patient will complete word-finding task with 90% acc with min cues. Goal 2: Patient will complete executive function task with 100% accuracy with min cues. Goal 3: Patient will complete memory recall task with 90% accuracy with min cues. Goal 4: Patient will complete high-level problem solving task with 100% accuracy with min cues. Goal 5: Patient with complete verbal and vision reasoning tasks with 90% accuracy with min cues. Goal 6: Patient will recall compensatory memory strategies during completion of functional tasks independently. Long-term Goals  Timeframe for Long-term Goals: 8 wks (8/26/2021)  Goal 1: The patient will improve cognitive linguistic abilities to a functionally independent level to improve her QOL and maintain current level of function.     Speech Therapy Prognosis  Prognosis: Good  Prognosis Considerations: Medical Diagnosis, Age, Medical Prognosis  Duration/Frequency of Treatment  Duration/Frequency of Treatment: 2x/wk    Recommendations  Requires SLP Intervention: Yes  Referral To: Neurology (Psych)  D/C Recommendations: Outpatient    Patient Education: SLP educated the patient re: Role of SLP, rationale for completion of assessment, anatomical components of swallow structures as they pertain to airway protection, results of assessment, recommendations and POC  Patient Education Response: Verbalizes understanding  D/C Recommendations: Outpatient  Requires SLP Intervention: Yes  Patient/family involved in developing goals and treatment plan: Patient    Latex Allergy: [x]NO  []YES    Preferred Language for Healthcare: [x]English []Other:    [x]Patient history, allergies, meds reviewed. Medical chart reviewed. See intake form. Review of Symptoms (ROS):  [x]Performed Review of Symptoms (Integumentary, Cardiopulmonary, Neurological) by intake and observation. Intake form has been scanned into medical record. Patient has been instructed to contact their primary care physician regarding ROS issues if not already being addressed at this time. C-SSRS Triggered by Intake questionnaire (Past 2 wk assessment):   [] No, Questionnaire did not trigger screening. [x] Yes, Patient intake triggered further evaluation      [x] C-SSRS Screening completed  [x] PCP notified via Plan of Care  [x] Emergency services notified    Upon eval, suicide screen was conducted. Due to the patient's response to the following questions and based upon algorithm, pt was taken to ED per protocol. The questions were:  \"Have you been feeling down, depressed, or hopeless? \" the patient responded \"yes\". \"Have you been bothered by little interest or pleasure in doing things? \" the patient responded \"yes\". CSSRS initiated for which patient responded \"yes\" to [facility] you had any actual thoughts of killing yourself? \"  Due to her response and per facility protocol, the patient was informed that facility protocol was to take the patient to the ED. The patient stated that she would like to go to the ED and that she wasn't aware that was an option for her. She was willingly escorted by SLP and SLP  to the ED. After pt was checked into the ED, she informed SLP and RN that her 80 y/o  was in their car with their dog.  Office staff checked on her  and upon the patient's request updated him of her status. ED charge RN aware that  and dog remain in the car in the parking lot and will update spouse.   -Edu Joseph CCC-SLP          Follow Up:   Follow up in: 1 week    [address] [facility]  Clinician    Co-Signing Supervisor:  Elba Irizarry M.A., [facility]  Speech-Language Pathologist  Phone: [phone]  Fax: [phone]  Desk: [phone]      Rubia Colvin

## 2021-07-01 NOTE — PROGRESS NOTES
aids/memory strategies for complex information and planning complex future events most of the time. When there is a breakdown in the use of recall/memory strategies/external memory aids, the individual occasionally requires minimal cues. These breakdowns may occasionally interfere with the individual's functioning in vocational, avocational, and social activities. []  Level 7 The individual is successful and independent in recalling or using external aids/memory strategies for complex information and planning future events in all vocational, avocational, and social activities.

## 2021-07-01 NOTE — ED NOTES
Presenting Problem:Patient presents to the ED voluntarily by speech therapy staff due to concerns of thoughts of wishing to be dead earlier today. Patient was clinically sober at the time of the evaluation. Appearance/Hygiene:  well-appearing, hair combed and clothes very neat and clean   Motor Behavior: No abnormal movements observed. Did not observe pt ambulate   Attitude: cooperative  Affect: congruent with reported mood   Speech: normal pitch and normal volume  Mood: anxious, worried   Thought Processes: Goal directed and Logical  Perceptions: Absent   Thought content:  future oriented  Orientation: A&Ox4   Memory: intact  Concentration: Good    Insight/ judgement: adequate insight and judgment during assessment      Psychosocial and contextual factors: Pt was at her speech therapy appointment today and verbalized she was struggling after a recent argument with her son and recent diagnosis of Dementia. She voluntarily came to the ED for further evaluation of her symptoms. Stated she and her son have had a turbulent relationship for the past 40 years and the argument yesterday was not out of the ordinary. \" It just hit me different because I have been worried about the possibility of not being able to care for my \". Pt stated earlier today she brought her dog with her in the car so she would feel safe. Denies active suicidal ideation or plan. \" I did have thoughts of wishing I wasn't here but I would never do anything to hurt myself\". Pt denies any difficulty with sleep, appetite, or ADL's. C-SSRS flowsheet is  Complete. Denies all hx suicide attempts or gestures.      Psychiatric History (including current outpatient provider and past inpatient admissions): Denies psychiatric history    Access to Firearms: Denies    ASSESSMENT FOR IMMINENT FUTURE DANGER:    RISK FACTORS:    [x]  Age <25 or >49   []  Male gender   []  Depressed mood   []  Active suicidal ideation   []  Suicide plan   []  Suicide attempt   []  Access to lethal means   []  Prior suicide attempt   []  Active substance abuse    []  Highly impulsive behaviors   []  Not attending to self-care/ADLs    []  Recent significant loss   []  Chronic pain or medical illness   []  Social isolation   []  History of violence    []  Active psychosis   []  Cognitive impairment    [x]  No outpatient services in place   []  Medication noncompliance   []  No collateral information to support safety  [] Self- injurious/ Self-harm behavior    PROTECTIVE FACTORS:  [] Age >25 and <55  [x] Female gender   [] Denies depression  [x] Denies suicidal ideation  [x] Does not have lethal plan   [x] Does not have access to guns or weapons  [x] Patient is verbally kasia for safety  [x] No prior suicide attempts  [x] No active substance abuse  [x] Patient has social or family support  [] No active psychosis or cognitive dysfunction-reports some difficulty with word finding  [x] Physically healthy  [] Has outpatient services in place  [x] Compliant with recommended medications  [x] Collateral information from  who supports patient safety   [x] Patient is future oriented with plans to continue to care for her . Identifies her children, grandchildren, and  as protective factors. , Alissa Saba, denies any safety concerns if pt is to be discharged. Level of Care Disposition: Discharge    Patient was seen by ED provider and [facility] staff. The case was presented to psychiatric provider on-call, Dr Ellis Gross. Based on the ED evaluation and information presented to the provider by this nurse, it is the recommendation of the on call psychiatric provider that the patient be discharged from a psychiatric standpoint with the following referrals: Crisis line, outpatient mental health referrals, Dementia/Alzheimers support groups.                         Chaka Redmond RN  07/01/21 0906

## 2021-07-01 NOTE — PROGRESS NOTES
Speech Language Pathology              [facility]      Outpatient Speech Therapy  Phone: [phone] Fax: [phone]    To: Dr. Venancio Chow      Patient: Margaret Deleon  : 1945  MRN: [id number]  Evaluation Date: 2021      Diagnosis Information: E19.0, other symbolic dysfunction            Speech Therapy Certification Form    Plan of Care/Treatment to date:  [x] Speech-Language Evaluation/Treatment    [] Dysphagia Evaluation/Treatment        [] Dysphagia Treatment via Neuromuscular Electrical Stimulation (NMES)   [] Modified Barium Swallowing Study   [] Fiberoptic Endoscopic Evaluation of Swallowing (FEES)  [x] Cognitive-Linguistic Skills Development  [] Voice evaluation and Treatment      [] Evaluation, modification, and Training of Voice Prosthetic     [] Evaluation for Speech-Generating Augmentative and Alternative Communication Device   [] Therapeutic Services for the use of Speech-Generating Device. [] Other:          Frequency/Duration:  # Days per week: [] 1 day # Weeks: [] 1 week [] 5 weeks      [x] 2 days   [] 2 weeks [] 6 weeks     [] 3 days   [] 3 weeks [] 7 weeks     [] 4 days   [] 4 weeks [x] 8 weeks    Rehab Potential: [] Excellent [x] Good [] Fair  [] Poor       Electronically signed by: FRANK Elizalde  Phone: [phone]  Fax: [phone]  If you have any questions or concerns, please don't hesitate to call.   Thank you for your referral.      Physician Signature:________________________________Date:__________________  By signing above, therapists plan is approved by physician

## 2021-07-01 NOTE — ED NOTES
Bed: 05  Expected date:   Expected time:   Means of arrival:   Comments:  Speech patient       Emeka Corona  07/01/21 6140

## 2021-07-01 NOTE — DISCHARGE INSTR - COC
Continuity of Care Form    Patient Name: Joann Chisholm   :  1945  MRN:  [id number]    Admit date:  2021  Discharge date:  ***    Code Status Order: Prior   Advance Directives:     Admitting Physician:  No admitting provider for patient encounter.   PCP: Prince Murillo MD    Discharging Nurse: [facility] Unit/Room#:   Discharging Unit Phone Number: ***    Emergency Contact:   Extended Emergency Contact Information  Primary Emergency Contact: [address] Phone: [phone]  Mobile Phone: [phone]  Relation: Child  Secondary Emergency Contact: [facility]  Address: [address] [place] [address] Phone: [phone]  Relation: Spouse    Past Surgical History:  Past Surgical History:   Procedure Laterality Date    BACK SURGERY      CARPAL TUNNEL RELEASE      CHOLECYSTECTOMY      COLONOSCOPY  3/25/13    normal    JOINT REPLACEMENT      IN TOTAL KNEE ARTHROPLASTY Left 2018    LEFT TOTAL KNEE MAKOPLASTY WITH PLATELET [address] PLASMA AND ADDUCTOR CANAL BLOCK FOR PAIN CONTROL                   RUI MAKOPLASTY  CPT CODE - 68343 performed by Jaida Andrews MD at [address] [place] [address] Right 2018       Immunization History:   Immunization History   Administered Date(s) Administered    Tdap (Boostrix, Adacel) 2012       Active Problems:  Patient Active Problem List   Diagnosis Code    Carpal tunnel syndrome of left wrist G56.02    Primary localized osteoarthrosis of right lower leg M17.11    Primary osteoarthritis of right knee M17.11    Primary osteoarthritis of left knee M17.12    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 .    Infection Onset Last Indicated Last Received Source    COVID-19 02/08/21 02/09/21  02/10/21 [facility]      Patient Infection Status     None to display          Nurse Assessment:  Last Vital Signs: There were no vitals taken for this visit. Last documented pain score (0-10 scale):    Last Weight:   Wt Readings from Last 1 Encounters:   06/17/21 134 lb (60.8 kg)     Mental Status:  {IP PT MENTAL STATUS:20030}    IV Access:  { ULISES IV ACCESS:790709478}    Nursing Mobility/ADLs:  Walking   {CHP DME LUCRETIA:021714149}  Transfer  {CHP DME OYLX:299873271}  Bathing  {CHP DME WXQC:765574683}  Dressing  {CHP DME MMIH:695853478}  Toileting  {CHP DME HAMX:667574562}  Feeding  {CHP DME PVKU:494658892}  Med Admin  {CHP DME RNFZ:609132431}  Med Delivery   { ULISES MED Delivery:870228327}    Wound Care Documentation and Therapy:  Incision 03/21/18 Knee Right (Active)   Number of days: 1198       Incision 08/21/18 Knee Left (Active)   Number of days: 7910        Elimination:  Continence:   · Bowel: {YES / LO:56095}  · Bladder: {YES / BB:99690}  Urinary Catheter: {Urinary Catheter:499604323}   Colostomy/Ileostomy/Ileal Conduit: {YES / TL:19513}       Date of Last BM: ***  No intake or output data in the 24 hours ending 07/01/21 1603  No intake/output data recorded.     Safety Concerns:     [email or web address] Safety Concerns:785934004}    Impairments/Disabilities:      [address] [email or web address] Impairments/Disabilities:963294048}    Nutrition Therapy:  Current Nutrition Therapy:   [address] [email or web address] Diet List:957260044}    Routes of Feeding: {CHP DME Other Feedings:148105172}  Liquids: {Slp liquid thickness:95632}  Daily Fluid Restriction: {CHP DME Yes amt example:743075816}  Last Modified Barium Swallow with Video (Video Swallowing Test): {Done Not Done UFFN:580615383}    Treatments at the Time of Hospital Discharge:   Respiratory Treatments: ***  Oxygen Therapy:  {Therapy; copd oxygen:52264}  Ventilator:    {MH CC Vent CRSM:875855680}    Rehab Therapies: {THERAPEUTIC INTERVENTION:7421758047}  Weight Bearing Status/Restrictions: 50[address] STEVENSON Weight Bearin}  Other Medical Equipment (for information only, NOT a DME order):  {EQUIPMENT:969106281}  Other Treatments: ***    Patient's personal belongings (please select all that are sent with patient):  {P DME Belongings:547542342}    RN SIGNATURE:  {Esignature:586399081}    CASE MANAGEMENT/SOCIAL WORK SECTION    Inpatient Status Date: ***    Readmission Risk Assessment Score:  Readmission Risk              Risk of Unplanned Readmission:  0           Discharging to Facility/ Agency   · Name:   · Address:  · Phone:  · Fax:    Dialysis Facility (if applicable)   · Name:  · Address:  · Dialysis Schedule:  · Phone:  · Fax:    / signature: {Esignature:945731142}    PHYSICIAN SECTION    Prognosis: {Prognosis:7789170985}    Condition at Discharge: [address] Patient Condition:929587778}    Rehab Potential (if transferring to Rehab): {Prognosis:7297289012}    Recommended Labs or Other Treatments After Discharge: ***    Physician Certification: I certify the above information and transfer of Erendira Gupta  is necessary for the continuing treatment of the diagnosis listed and that she requires {Admit to Appropriate Level of Care:81283} for {GREATER/LESS:679680703} 30 days.      Update Admission H&P: {CHP DME Changes in [facility]:299374398}    PHYSICIAN SIGNATURE:  {Esignature:900343777}

## 2021-09-09 ENCOUNTER — OFFICE VISIT (OUTPATIENT)
Dept: NEUROLOGY | Age: 76
End: 2021-09-09
Payer: MEDICARE

## 2021-09-09 VITALS
HEART RATE: 64 BPM | BODY MASS INDEX: 24.66 KG/M2 | SYSTOLIC BLOOD PRESSURE: 142 MMHG | OXYGEN SATURATION: 98 % | WEIGHT: 134 LBS | HEIGHT: 62 IN | DIASTOLIC BLOOD PRESSURE: 67 MMHG

## 2021-09-09 DIAGNOSIS — G30.9 DEMENTIA DUE TO ALZHEIMER'S DISEASE (HCC): ICD-10-CM

## 2021-09-09 DIAGNOSIS — G25.0 ESSENTIAL TREMOR: ICD-10-CM

## 2021-09-09 DIAGNOSIS — R41.3 MEMORY LOSS: Primary | ICD-10-CM

## 2021-09-09 DIAGNOSIS — F02.80 DEMENTIA DUE TO ALZHEIMER'S DISEASE (HCC): ICD-10-CM

## 2021-09-09 PROCEDURE — 1090F PRES/ABSN URINE INCON ASSESS: CPT | Performed by: PSYCHIATRY & NEUROLOGY

## 2021-09-09 PROCEDURE — G8420 CALC BMI NORM PARAMETERS: HCPCS | Performed by: PSYCHIATRY & NEUROLOGY

## 2021-09-09 PROCEDURE — G8400 PT W/DXA NO RESULTS DOC: HCPCS | Performed by: PSYCHIATRY & NEUROLOGY

## 2021-09-09 PROCEDURE — 1036F TOBACCO NON-USER: CPT | Performed by: PSYCHIATRY & NEUROLOGY

## 2021-09-09 PROCEDURE — 99214 OFFICE O/P EST MOD 30 MIN: CPT | Performed by: PSYCHIATRY & NEUROLOGY

## 2021-09-09 PROCEDURE — 1123F ACP DISCUSS/DSCN MKR DOCD: CPT | Performed by: PSYCHIATRY & NEUROLOGY

## 2021-09-09 PROCEDURE — 4040F PNEUMOC VAC/ADMIN/RCVD: CPT | Performed by: PSYCHIATRY & NEUROLOGY

## 2021-09-09 PROCEDURE — 3017F COLORECTAL CA SCREEN DOC REV: CPT | Performed by: PSYCHIATRY & NEUROLOGY

## 2021-09-09 PROCEDURE — G8427 DOCREV CUR MEDS BY ELIG CLIN: HCPCS | Performed by: PSYCHIATRY & NEUROLOGY

## 2021-09-09 RX ORDER — DONEPEZIL HYDROCHLORIDE 10 MG/1
10 TABLET, FILM COATED ORAL DAILY
Qty: 90 TABLET | Refills: 1 | Status: SHIPPED | OUTPATIENT
Start: 2021-09-09 | End: 2021-09-09 | Stop reason: SDUPTHER

## 2021-09-09 RX ORDER — MEMANTINE HYDROCHLORIDE 14 MG/1
14 CAPSULE, EXTENDED RELEASE ORAL DAILY
Qty: 90 CAPSULE | Refills: 1 | Status: SHIPPED | OUTPATIENT
Start: 2021-09-09 | End: 2022-03-09 | Stop reason: SDUPTHER

## 2021-09-09 RX ORDER — DONEPEZIL HYDROCHLORIDE 10 MG/1
10 TABLET, FILM COATED ORAL DAILY
Qty: 90 TABLET | Refills: 1 | Status: SHIPPED | OUTPATIENT
Start: 2021-09-09 | End: 2022-03-09 | Stop reason: SDUPTHER

## 2021-09-09 RX ORDER — PRIMIDONE 50 MG/1
50 TABLET ORAL NIGHTLY
Qty: 90 TABLET | Refills: 1 | Status: SHIPPED | OUTPATIENT
Start: 2021-09-09 | End: 2022-03-04

## 2021-09-09 NOTE — PROGRESS NOTES
The patient came today for follow up regarding: Chronic tremors and dementia    Since the patient's last visit, she continues to take Mysoline 50 mg at night. No side effect of Mysoline. She feels better with less tremors. Hand tremors now are less frequent and degree is mild to moderate. Duration is minutes. Frequency is sporadic at the same triggers per last visit. She lives with her . She is on Namenda and Aricept the same dose. No side effect of these medications. She is requesting refill. The same episodic memory loss but not severe daily to interfere with ADL. Triggered by multitasking or stress. She denies any recent injury or fever. She takes aspirin daily. Other review of system was unremarkable except for occasional double vision. Exam:   Constitutional:   Vitals:    09/09/21 1240   BP: (!) 142/67   Pulse: 64   SpO2: 98%   Weight: 134 lb (60.8 kg)   Height: 5' 2\" (1.575 m)       General appearance:  Normal development and appear in no acute distress. Mental Status:   Oriented to person, place, problem, and time. Memory: Good immediate recall. Intact remote memory  Normal attention span and concentration. Language: intact naming, repeating and fluency   Poor fund of knowledge  Cranial Nerves:   II: Pupils: equal, round, reactive to light  III,IV,VI: Extra Ocular Movements are intact. No nystagmus  V: Facial sensation is intact   VII: Facial strength and movements: intact and symmetric  XII: Tongue movements are normal  Musculoskeletal: 5/5 in all 4 extremities. Coordination: no tremors or drift  Sensation: normal to all modalities in both arms and legs.   No tremors today  Gait/Posture: steady gait     ROS : A 10-14 system review of constitutional, cardiovascular, respiratory, GI, eyes, , ENT, musculoskeletal, endocrine, hematological, skin, SHEENT, genitourinary, psychiatric and neurologic systems was obtained and updated today which is unremarkable except as mentioned in my HPI      Medical decision making:  I personally reviewed and updated social history, past medical history, medications, allergy, surgical history, and family history as documented in the patient's electronic health records. Labs and other test results reviewed and discussed with the patient. Reviewed notes from other physicians. Provided patient education regarding risk, benefits and treatment options as well as adherence to medication regimen and side effect from these medications. Assessment:  1. Memory loss  2. Dementia due to Alzheimer's disease ([facility] [address] moderate so far  Continue Aricept 10 mg daily  Continue Namenda 14 mg daily  Refill for these medications  Cognitive therapy, driving restriction and risk of falling were discussed today    - donepezil (ARICEPT) 10 MG tablet; Take 1 tablet by mouth daily  Dispense: 90 tablet; Refill: 1    3.  Essential tremor  Continue Mysoline 50 mg at night  Refill for Mysoline  Discussed possibility of sedation and falling with such medicine  Aspirin for stroke prevention  Multivitamin    Follow-up 6-month

## 2022-03-04 DIAGNOSIS — G25.0 ESSENTIAL TREMOR: ICD-10-CM

## 2022-03-04 RX ORDER — PRIMIDONE 50 MG/1
TABLET ORAL
Qty: 90 TABLET | Refills: 1 | Status: SHIPPED | OUTPATIENT
Start: 2022-03-04 | End: 2022-03-09 | Stop reason: SDUPTHER

## 2022-03-09 ENCOUNTER — OFFICE VISIT (OUTPATIENT)
Dept: NEUROLOGY | Age: 77
End: 2022-03-09
Payer: MEDICARE

## 2022-03-09 VITALS
HEART RATE: 56 BPM | DIASTOLIC BLOOD PRESSURE: 91 MMHG | BODY MASS INDEX: 26.16 KG/M2 | OXYGEN SATURATION: 99 % | WEIGHT: 143 LBS | SYSTOLIC BLOOD PRESSURE: 169 MMHG

## 2022-03-09 DIAGNOSIS — F02.80 DEMENTIA DUE TO ALZHEIMER'S DISEASE (HCC): ICD-10-CM

## 2022-03-09 DIAGNOSIS — R41.3 MEMORY LOSS: ICD-10-CM

## 2022-03-09 DIAGNOSIS — G25.0 ESSENTIAL TREMOR: ICD-10-CM

## 2022-03-09 DIAGNOSIS — G30.9 DEMENTIA DUE TO ALZHEIMER'S DISEASE (HCC): ICD-10-CM

## 2022-03-09 PROCEDURE — 99214 OFFICE O/P EST MOD 30 MIN: CPT | Performed by: PSYCHIATRY & NEUROLOGY

## 2022-03-09 PROCEDURE — G8427 DOCREV CUR MEDS BY ELIG CLIN: HCPCS | Performed by: PSYCHIATRY & NEUROLOGY

## 2022-03-09 PROCEDURE — 1090F PRES/ABSN URINE INCON ASSESS: CPT | Performed by: PSYCHIATRY & NEUROLOGY

## 2022-03-09 PROCEDURE — G8484 FLU IMMUNIZE NO ADMIN: HCPCS | Performed by: PSYCHIATRY & NEUROLOGY

## 2022-03-09 PROCEDURE — 1123F ACP DISCUSS/DSCN MKR DOCD: CPT | Performed by: PSYCHIATRY & NEUROLOGY

## 2022-03-09 PROCEDURE — 1036F TOBACCO NON-USER: CPT | Performed by: PSYCHIATRY & NEUROLOGY

## 2022-03-09 PROCEDURE — 4040F PNEUMOC VAC/ADMIN/RCVD: CPT | Performed by: PSYCHIATRY & NEUROLOGY

## 2022-03-09 PROCEDURE — G8417 CALC BMI ABV UP PARAM F/U: HCPCS | Performed by: PSYCHIATRY & NEUROLOGY

## 2022-03-09 PROCEDURE — 3017F COLORECTAL CA SCREEN DOC REV: CPT | Performed by: PSYCHIATRY & NEUROLOGY

## 2022-03-09 PROCEDURE — G8400 PT W/DXA NO RESULTS DOC: HCPCS | Performed by: PSYCHIATRY & NEUROLOGY

## 2022-03-09 RX ORDER — MEMANTINE HYDROCHLORIDE 14 MG/1
14 CAPSULE, EXTENDED RELEASE ORAL DAILY
Qty: 90 CAPSULE | Refills: 1 | Status: SHIPPED | OUTPATIENT
Start: 2022-03-09 | End: 2022-09-06

## 2022-03-09 RX ORDER — PRIMIDONE 50 MG/1
TABLET ORAL
Qty: 90 TABLET | Refills: 1 | Status: SHIPPED | OUTPATIENT
Start: 2022-03-09 | End: 2022-09-27 | Stop reason: SDUPTHER

## 2022-03-09 RX ORDER — DONEPEZIL HYDROCHLORIDE 10 MG/1
10 TABLET, FILM COATED ORAL DAILY
Qty: 90 TABLET | Refills: 1 | Status: SHIPPED | OUTPATIENT
Start: 2022-03-09 | End: 2022-09-27

## 2022-03-09 NOTE — PROGRESS NOTES
personally reviewed and updated social history, past medical history, medications, allergy, surgical history, and family history as documented in the patient's electronic health records. No changes  Labs and other test results reviewed and discussed with the patient. Reviewed notes from other physicians. Provided patient education regarding risk, benefits and treatment options as well as adherence to medication regimen and side effect from these medications. Assessment:   Diagnosis Orders   1. Memory loss  donepezil (ARICEPT) 10 MG tablet   2. [facility]  donepezil (ARICEPT) 10 MG tablet    memantine ER (NAMENDA XR) 14 MG CP24 extended release capsule   3.  Essential tremor  primidone (MYSOLINE) 50 MG tablet         Continue Aricept 10 mg daily  Continue Namenda 14 mg daily  Refill for medications  Side effect was discussed  Lengthy discussion with the patient and family regarding cognitive therapy and lifestyle adjustment that could help memory at home  Avoid driving  Improving sleep hygiene  Multivitamin daily    Continue Mysoline 50 mg daily  6-month refill  Avoid sedation  Continue aspirin and a statin  Encourage weekly walking    Follow-up 6-month

## 2022-09-05 DIAGNOSIS — F02.80 DEMENTIA DUE TO ALZHEIMER'S DISEASE (HCC): ICD-10-CM

## 2022-09-05 DIAGNOSIS — G30.9 DEMENTIA DUE TO ALZHEIMER'S DISEASE (HCC): ICD-10-CM

## 2022-09-06 RX ORDER — MEMANTINE HYDROCHLORIDE 14 MG/1
CAPSULE, EXTENDED RELEASE ORAL
Qty: 90 CAPSULE | Refills: 0 | Status: SHIPPED | OUTPATIENT
Start: 2022-09-06

## 2022-09-27 ENCOUNTER — OFFICE VISIT (OUTPATIENT)
Dept: NEUROLOGY | Age: 77
End: 2022-09-27
Payer: MEDICARE

## 2022-09-27 VITALS
BODY MASS INDEX: 23.77 KG/M2 | DIASTOLIC BLOOD PRESSURE: 76 MMHG | OXYGEN SATURATION: 98 % | HEART RATE: 62 BPM | SYSTOLIC BLOOD PRESSURE: 155 MMHG | HEIGHT: 62 IN | WEIGHT: 129.2 LBS

## 2022-09-27 DIAGNOSIS — F02.80 DEMENTIA DUE TO ALZHEIMER'S DISEASE (HCC): ICD-10-CM

## 2022-09-27 DIAGNOSIS — G30.9 DEMENTIA DUE TO ALZHEIMER'S DISEASE (HCC): ICD-10-CM

## 2022-09-27 DIAGNOSIS — G25.0 ESSENTIAL TREMOR: Primary | ICD-10-CM

## 2022-09-27 PROCEDURE — 1090F PRES/ABSN URINE INCON ASSESS: CPT | Performed by: PSYCHIATRY & NEUROLOGY

## 2022-09-27 PROCEDURE — G8427 DOCREV CUR MEDS BY ELIG CLIN: HCPCS | Performed by: PSYCHIATRY & NEUROLOGY

## 2022-09-27 PROCEDURE — 1123F ACP DISCUSS/DSCN MKR DOCD: CPT | Performed by: PSYCHIATRY & NEUROLOGY

## 2022-09-27 PROCEDURE — 99213 OFFICE O/P EST LOW 20 MIN: CPT | Performed by: PSYCHIATRY & NEUROLOGY

## 2022-09-27 PROCEDURE — 1036F TOBACCO NON-USER: CPT | Performed by: PSYCHIATRY & NEUROLOGY

## 2022-09-27 PROCEDURE — G8420 CALC BMI NORM PARAMETERS: HCPCS | Performed by: PSYCHIATRY & NEUROLOGY

## 2022-09-27 PROCEDURE — G8400 PT W/DXA NO RESULTS DOC: HCPCS | Performed by: PSYCHIATRY & NEUROLOGY

## 2022-09-27 RX ORDER — PRIMIDONE 50 MG/1
TABLET ORAL
Qty: 90 TABLET | Refills: 1 | Status: SHIPPED | OUTPATIENT
Start: 2022-09-27

## 2022-09-27 RX ORDER — DONEPEZIL HYDROCHLORIDE 10 MG/1
1 TABLET, FILM COATED ORAL DAILY
COMMUNITY

## 2022-09-27 NOTE — PROGRESS NOTES
The patient came today for follow up regarding: Chronic tremors and dementia      Since last visit, she continues to take Mysoline 50 mg daily. No side effect from Mysoline or severe sedation. Tremors in both hands are mild but not severe. The same triggers with fine motor skills. No other relieving or aggravating factors. Duration is minutes. She does have the same intermittent short-term memory loss. Degree is moderate. Forgetfulness to date events or recent conversationof what she is independent. No recent MVA or getting lost in direction. No severe insomnia or psychosis. She is on the same dose of Aricept and Namenda. No other new symptoms today. No severe depression. Other review of system was unremarkable. Exam:   Constitutional:   Vitals:    09/27/22 1024 09/27/22 1047   BP: (!) 147/73 (!) 155/76   Site: Right Wrist Left Wrist   Position: Sitting Sitting   Pulse: 57 62   SpO2: 98%    Weight: 129 lb 3.2 oz (58.6 kg)    Height: 5' 2\" (1.575 m)        General appearance:  Normal development and appear in no acute distress. Mental Status:   Oriented to person, place, problem, and time. Memory: Good immediate recall. Intact remote memory  Normal attention span and concentration. Language: intact naming, repeating and fluency   Poor fund of knowledge  Cranial Nerves:   II: Pupils: equal, round, reactive to light  III,IV,VI: Extra Ocular Movements are intact. No nystagmus  VII: Facial strength and movements: intact and symmetric  XII: Tongue movements are normal  Musculoskeletal: 5/5 in all 4 extremities.    Coordination: no tremors or drift  Sensation: normal.    Gait/Posture: steady gait     ROS : A 10-14 system review of constitutional, cardiovascular, respiratory, GI, eyes, , ENT, musculoskeletal, endocrine, hematological, skin, SHEENT, genitourinary, psychiatric and neurologic systems was obtained and updated today which is unremarkable except as mentioned in my HPI      Medical decision making:  I personally reviewed and updated social history, past medical history, medications, allergy, surgical history, and family history as documented in the patient's electronic health records. No changes  Labs and other test results reviewed and discussed with the patient. Reviewed notes from other physicians. Provided patient education regarding risk, benefits and treatment options as well as adherence to medication regimen and side effect from these medications. Assessment:   Diagnosis Orders   1. Essential tremor  primidone (MYSOLINE) 50 MG tablet      2.  [facility]                Continue Aricept 10 mg daily  Continue Namenda 14 mg daily  Refill for medications when needed  Side effect was discussed  Continue Mysoline 50 mg daily  6-month refill  Avoid sedation  MVI  Fall precautions  Continue SSRI  Avoid naps  Discussed cognitive  therapy  Consider speech therapy  Follow up 6 months

## 2022-11-12 DIAGNOSIS — F02.80 DEMENTIA DUE TO ALZHEIMER'S DISEASE (HCC): ICD-10-CM

## 2022-11-12 DIAGNOSIS — G30.9 DEMENTIA DUE TO ALZHEIMER'S DISEASE (HCC): ICD-10-CM

## 2022-11-14 RX ORDER — MEMANTINE HYDROCHLORIDE 14 MG/1
CAPSULE, EXTENDED RELEASE ORAL
Qty: 90 CAPSULE | Refills: 1 | Status: SHIPPED | OUTPATIENT
Start: 2022-11-14

## 2023-03-28 ENCOUNTER — OFFICE VISIT (OUTPATIENT)
Dept: NEUROLOGY | Age: 78
End: 2023-03-28
Payer: MEDICARE

## 2023-03-28 VITALS
BODY MASS INDEX: 22.82 KG/M2 | DIASTOLIC BLOOD PRESSURE: 76 MMHG | HEART RATE: 55 BPM | HEIGHT: 62 IN | OXYGEN SATURATION: 98 % | SYSTOLIC BLOOD PRESSURE: 184 MMHG | WEIGHT: 124 LBS

## 2023-03-28 DIAGNOSIS — G25.0 ESSENTIAL TREMOR: ICD-10-CM

## 2023-03-28 DIAGNOSIS — F02.80 DEMENTIA DUE TO ALZHEIMER'S DISEASE (HCC): Primary | ICD-10-CM

## 2023-03-28 DIAGNOSIS — G30.9 DEMENTIA DUE TO ALZHEIMER'S DISEASE (HCC): Primary | ICD-10-CM

## 2023-03-28 PROCEDURE — G8420 CALC BMI NORM PARAMETERS: HCPCS | Performed by: PSYCHIATRY & NEUROLOGY

## 2023-03-28 PROCEDURE — 1123F ACP DISCUSS/DSCN MKR DOCD: CPT | Performed by: PSYCHIATRY & NEUROLOGY

## 2023-03-28 PROCEDURE — 99214 OFFICE O/P EST MOD 30 MIN: CPT | Performed by: PSYCHIATRY & NEUROLOGY

## 2023-03-28 PROCEDURE — 1036F TOBACCO NON-USER: CPT | Performed by: PSYCHIATRY & NEUROLOGY

## 2023-03-28 PROCEDURE — G8400 PT W/DXA NO RESULTS DOC: HCPCS | Performed by: PSYCHIATRY & NEUROLOGY

## 2023-03-28 PROCEDURE — G8427 DOCREV CUR MEDS BY ELIG CLIN: HCPCS | Performed by: PSYCHIATRY & NEUROLOGY

## 2023-03-28 PROCEDURE — 1090F PRES/ABSN URINE INCON ASSESS: CPT | Performed by: PSYCHIATRY & NEUROLOGY

## 2023-03-28 PROCEDURE — G8484 FLU IMMUNIZE NO ADMIN: HCPCS | Performed by: PSYCHIATRY & NEUROLOGY

## 2023-03-28 RX ORDER — MELOXICAM 15 MG/1
1 TABLET ORAL DAILY
COMMUNITY
Start: 2023-02-04

## 2023-03-28 NOTE — PROGRESS NOTES
ENT, musculoskeletal, endocrine, hematological, skin, SHEENT, genitourinary, psychiatric and neurologic systems was obtained and updated today which is unremarkable except as mentioned in my HPI      Medical decision making:  I personally reviewed and updated social history, past medical history, medications, allergy, surgical history, and family history as documented in the patient's electronic health records. Labs and other test results reviewed and discussed with the patient. Reviewed notes from other physicians. Provided patient education regarding risk, benefits and treatment options as well as adherence to medication regimen and side effect from these medications. Assessment:   Diagnosis Orders   1. Essential tremor        2. Dementia due to Alzheimer's disease (HCC)                  Continue Aricept 10 mg daily  Continue Namenda 14 mg daily  Continue Mysoline 50 mg twice daily  Refill for these medications  Lengthy discussion with the patient and her daughter regarding driving restrictions and precautions.   Patient voiced understanding and agreed to comply with regulations  Improving sleep hygiene  Continue SSRI  Aspirin  Follow-up 6-month

## 2023-05-09 DIAGNOSIS — G30.9 DEMENTIA DUE TO ALZHEIMER'S DISEASE (HCC): ICD-10-CM

## 2023-05-09 DIAGNOSIS — F02.80 DEMENTIA DUE TO ALZHEIMER'S DISEASE (HCC): ICD-10-CM

## 2023-05-09 RX ORDER — DONEPEZIL HYDROCHLORIDE 10 MG/1
TABLET, FILM COATED ORAL
Qty: 90 TABLET | Refills: 1 | Status: SHIPPED | OUTPATIENT
Start: 2023-05-09

## 2023-05-09 RX ORDER — MEMANTINE HYDROCHLORIDE 14 MG/1
CAPSULE, EXTENDED RELEASE ORAL
Qty: 90 CAPSULE | Refills: 1 | Status: SHIPPED | OUTPATIENT
Start: 2023-05-09

## 2023-05-09 NOTE — TELEPHONE ENCOUNTER
LOV 3-  NOV [id number]    Requesting refill Namenda XR 14 MG 1 cap daily    Last fill 2-8-2023    Medication pended

## 2023-07-02 ENCOUNTER — APPOINTMENT (OUTPATIENT)
Dept: GENERAL RADIOLOGY | Age: 78
End: 2023-07-02
Payer: OTHER MISCELLANEOUS

## 2023-07-02 ENCOUNTER — APPOINTMENT (OUTPATIENT)
Dept: CT IMAGING | Age: 78
End: 2023-07-02
Payer: OTHER MISCELLANEOUS

## 2023-07-02 ENCOUNTER — HOSPITAL ENCOUNTER (EMERGENCY)
Age: 78
Discharge: ANOTHER HOSPITAL | End: 2023-07-02
Attending: EMERGENCY MEDICINE
Payer: OTHER MISCELLANEOUS

## 2023-07-02 VITALS
RESPIRATION RATE: 16 BRPM | WEIGHT: 124 LBS | TEMPERATURE: 98.2 F | HEART RATE: 65 BPM | HEIGHT: 62 IN | DIASTOLIC BLOOD PRESSURE: 70 MMHG | OXYGEN SATURATION: 99 % | SYSTOLIC BLOOD PRESSURE: 167 MMHG | BODY MASS INDEX: 22.82 KG/M2
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
## 2023-07-02 DIAGNOSIS — I10 HYPERTENSION, UNSPECIFIED TYPE: ICD-10-CM

## 2023-07-02 DIAGNOSIS — S12.9XXA CLOSED FRACTURE OF CERVICAL VERTEBRA, UNSPECIFIED CERVICAL VERTEBRAL LEVEL, INITIAL ENCOUNTER (HCC): Primary | ICD-10-CM

## 2023-07-02 DIAGNOSIS — T14.90XA TRAUMA: ICD-10-CM

## 2023-07-02 LAB
ALBUMIN SERPL-MCNC: 4.1 G/DL (ref 3.4–5)
ALBUMIN/GLOB SERPL: 1.9 {RATIO} (ref 1.1–2.2)
ALP SERPL-CCNC: 77 U/L (ref 40–129)
ALT SERPL-CCNC: 13 U/L (ref 10–40)
ANION GAP SERPL CALCULATED.3IONS-SCNC: 9 MMOL/L (ref 3–16)
APTT BLD: 26.1 SEC (ref 22.7–35.9)
AST SERPL-CCNC: 19 U/L (ref 15–37)
BASOPHILS # BLD: 0 K/UL (ref 0–0.2)
BASOPHILS NFR BLD: 0.3 %
BILIRUB SERPL-MCNC: 0.6 MG/DL (ref 0–1)
BUN SERPL-MCNC: 23 MG/DL (ref 7–20)
CALCIUM SERPL-MCNC: 9 MG/DL (ref 8.3–10.6)
CHLORIDE SERPL-SCNC: 108 MMOL/L (ref 99–110)
CO2 SERPL-SCNC: 26 MMOL/L (ref 21–32)
CREAT SERPL-MCNC: 0.7 MG/DL (ref 0.6–1.2)
DEPRECATED RDW RBC AUTO: 13.8 % (ref 12.4–15.4)
EOSINOPHIL # BLD: 0.1 K/UL (ref 0–0.6)
EOSINOPHIL NFR BLD: 0.6 %
GFR SERPLBLD CREATININE-BSD FMLA CKD-EPI: >60 ML/MIN/{1.73_M2}
GLUCOSE SERPL-MCNC: 129 MG/DL (ref 70–99)
HCT VFR BLD AUTO: 35.9 % (ref 36–48)
HGB BLD-MCNC: 12.3 G/DL (ref 12–16)
INR PPP: 1.06 (ref 0.84–1.16)
LYMPHOCYTES # BLD: 0.6 K/UL (ref 1–5.1)
LYMPHOCYTES NFR BLD: 5.9 %
MCH RBC QN AUTO: 32.6 PG (ref 26–34)
MCHC RBC AUTO-ENTMCNC: 34.3 G/DL (ref 31–36)
MCV RBC AUTO: 95.2 FL (ref 80–100)
MONOCYTES # BLD: 0.3 K/UL (ref 0–1.3)
MONOCYTES NFR BLD: 3 %
NEUTROPHILS # BLD: 9.1 K/UL (ref 1.7–7.7)
NEUTROPHILS NFR BLD: 90.2 %
PLATELET # BLD AUTO: 152 K/UL (ref 135–450)
PMV BLD AUTO: 9.2 FL (ref 5–10.5)
POTASSIUM SERPL-SCNC: 3.9 MMOL/L (ref 3.5–5.1)
PROT SERPL-MCNC: 6.3 G/DL (ref 6.4–8.2)
PROTHROMBIN TIME: 13.8 SEC (ref 11.5–14.8)
RBC # BLD AUTO: 3.78 M/UL (ref 4–5.2)
SODIUM SERPL-SCNC: 143 MMOL/L (ref 136–145)
WBC # BLD AUTO: 10.1 K/UL (ref 4–11)

## 2023-07-02 PROCEDURE — 6360000004 HC RX CONTRAST MEDICATION: Performed by: EMERGENCY MEDICINE

## 2023-07-02 PROCEDURE — 72125 CT NECK SPINE W/O DYE: CPT

## 2023-07-02 PROCEDURE — 70450 CT HEAD/BRAIN W/O DYE: CPT

## 2023-07-02 PROCEDURE — 72170 X-RAY EXAM OF PELVIS: CPT

## 2023-07-02 PROCEDURE — 6360000002 HC RX W HCPCS: Performed by: EMERGENCY MEDICINE

## 2023-07-02 PROCEDURE — 85730 THROMBOPLASTIN TIME PARTIAL: CPT

## 2023-07-02 PROCEDURE — 36415 COLL VENOUS BLD VENIPUNCTURE: CPT

## 2023-07-02 PROCEDURE — 80053 COMPREHEN METABOLIC PANEL: CPT

## 2023-07-02 PROCEDURE — 85025 COMPLETE CBC W/AUTO DIFF WBC: CPT

## 2023-07-02 PROCEDURE — 70486 CT MAXILLOFACIAL W/O DYE: CPT

## 2023-07-02 PROCEDURE — 99285 EMERGENCY DEPT VISIT HI MDM: CPT

## 2023-07-02 PROCEDURE — 85610 PROTHROMBIN TIME: CPT

## 2023-07-02 PROCEDURE — 70498 CT ANGIOGRAPHY NECK: CPT

## 2023-07-02 PROCEDURE — 71045 X-RAY EXAM CHEST 1 VIEW: CPT

## 2023-07-02 PROCEDURE — 96374 THER/PROPH/DIAG INJ IV PUSH: CPT

## 2023-07-02 PROCEDURE — 96375 TX/PRO/DX INJ NEW DRUG ADDON: CPT

## 2023-07-02 PROCEDURE — 6370000000 HC RX 637 (ALT 250 FOR IP): Performed by: EMERGENCY MEDICINE

## 2023-07-02 RX ORDER — ONDANSETRON 2 MG/ML
4 INJECTION INTRAMUSCULAR; INTRAVENOUS ONCE
Status: COMPLETED | OUTPATIENT
Start: 2023-07-02 | End: 2023-07-02

## 2023-07-02 RX ORDER — ACETAMINOPHEN 325 MG/1
650 TABLET ORAL ONCE
Status: COMPLETED | OUTPATIENT
Start: 2023-07-02 | End: 2023-07-02

## 2023-07-02 RX ORDER — MORPHINE SULFATE 2 MG/ML
2 INJECTION, SOLUTION INTRAMUSCULAR; INTRAVENOUS ONCE
Status: COMPLETED | OUTPATIENT
Start: 2023-07-02 | End: 2023-07-02

## 2023-07-02 RX ORDER — CLONIDINE HYDROCHLORIDE 0.1 MG/1
0.1 TABLET ORAL ONCE
Status: COMPLETED | OUTPATIENT
Start: 2023-07-02 | End: 2023-07-02

## 2023-07-02 RX ADMIN — ACETAMINOPHEN 650 MG: 325 TABLET ORAL at 12:50

## 2023-07-02 RX ADMIN — ONDANSETRON 4 MG: 2 INJECTION INTRAMUSCULAR; INTRAVENOUS at 17:19

## 2023-07-02 RX ADMIN — IOMEPROL INJECTION 75 ML: 714 INJECTION, SOLUTION INTRAVASCULAR at 15:15

## 2023-07-02 RX ADMIN — MORPHINE SULFATE 2 MG: 2 INJECTION, SOLUTION INTRAMUSCULAR; INTRAVENOUS at 17:19

## 2023-07-02 RX ADMIN — CLONIDINE HYDROCHLORIDE 0.1 MG: 0.1 TABLET ORAL at 12:50

## 2023-07-02 ASSESSMENT — PAIN DESCRIPTION - ONSET: ONSET: SUDDEN

## 2023-07-02 ASSESSMENT — PAIN DESCRIPTION - LOCATION
LOCATION: HEAD
LOCATION: NECK

## 2023-07-02 ASSESSMENT — PAIN DESCRIPTION - PAIN TYPE
TYPE: ACUTE PAIN
TYPE: ACUTE PAIN

## 2023-07-02 ASSESSMENT — PAIN - FUNCTIONAL ASSESSMENT
PAIN_FUNCTIONAL_ASSESSMENT: 0-10
PAIN_FUNCTIONAL_ASSESSMENT: ACTIVITIES ARE NOT PREVENTED
PAIN_FUNCTIONAL_ASSESSMENT: ACTIVITIES ARE NOT PREVENTED

## 2023-07-02 ASSESSMENT — PAIN DESCRIPTION - FREQUENCY: FREQUENCY: CONTINUOUS

## 2023-07-02 ASSESSMENT — PAIN SCALES - GENERAL
PAINLEVEL_OUTOF10: 7
PAINLEVEL_OUTOF10: 4

## 2023-07-02 ASSESSMENT — PAIN DESCRIPTION - DESCRIPTORS: DESCRIPTORS: PATIENT UNABLE TO DESCRIBE

## 2023-07-07 NOTE — PATIENT INSTRUCTIONS
Patient Education        Knee Arthritis: Care Instructions  Your Care Instructions  Knee arthritis is a breakdown of the cartilage that cushions your knee joint. When the cartilage wears down, your bones rub against each other. This causes pain and stiffness. Knee arthritis tends to get worse with time. Treatment for knee arthritis involves reducing pain, making the leg muscles stronger, and staying at a healthy body weight. The treatment usually does not improve the health of the cartilage, but it can reduce pain and improve how well your knee works. You can take simple measures to protect your knee joints, ease your pain, and help you stay active. Follow-up care is a key part of your treatment and safety. Be sure to make and go to all appointments, and call your doctor if you are having problems. It's also a good idea to know your test results and keep a list of the medicines you take. How can you care for yourself at home? · Know that knee arthritis will cause more pain on some days than on others. · Stay at a healthy weight. Lose weight if you are overweight. When you stand up, the pressure on your knees from every pound of body weight is multiplied four times. So if you lose 10 pounds, you will reduce the pressure on your knees by 40 pounds. · Talk to your doctor or physical therapist about exercises that will help ease joint pain. ¨ Stretch to help prevent stiffness and to prevent injury before you exercise. You may enjoy gentle forms of yoga to help keep your knee joints and muscles flexible. ¨ Walk instead of jog. ¨ Ride a bike. This makes your thigh muscles stronger and takes pressure off your knee. ¨ Wear well-fitting and comfortable shoes. ¨ Exercise in chest-deep water. This can help you exercise longer with less pain. ¨ Avoid exercises that include squatting or kneeling. They can put a lot of strain on your knees.   ¨ Talk to your doctor to make sure that the exercise you do is not making the arthritis worse. · Do not sit for long periods of time. Try to walk once in a while to keep your knee from getting stiff. · Ask your doctor or physical therapist whether shoe inserts may reduce your arthritis pain. · If you can afford it, get new athletic shoes at least every year. This can help reduce the strain on your knees. · Use a device to help you do everyday activities. ¨ A cane or walking stick can help you keep your balance when you walk. Hold the cane or walking stick in the hand opposite the painful knee. ¨ If you feel like you may fall when you walk, try using crutches or a front-wheeled walker. These can prevent falls that could cause more damage to your knee. ¨ A knee brace may help keep your knee stable and prevent pain. ¨ You also can use other things to make life easier, such as a higher toilet seat and handrails in the bathtub or shower. · Take pain medicines exactly as directed. ¨ Do not wait until you are in severe pain. You will get better results if you take it sooner. ¨ If you are not taking a prescription pain medicine, take an over-the-counter medicine such as acetaminophen (Tylenol), ibuprofen (Advil, Motrin), or naproxen (Aleve). Read and follow all instructions on the label. ¨ Do not take two or more pain medicines at the same time unless the doctor told you to. Many pain medicines have acetaminophen, which is Tylenol. Too much acetaminophen (Tylenol) can be harmful. ¨ Tell your doctor if you take a blood thinner, have diabetes, or have allergies to shellfish. · Ask your doctor if you might benefit from a shot of steroid medicine into your knee. This may provide pain relief for several months. · Many people take the supplements glucosamine and chondroitin for osteoarthritis. Some people feel they help, but the medical research does not show that they work. Talk to your doctor before you take these supplements. When should you call for help?   Call your doctor now or seek immediate medical care if:  · You have sudden swelling, warmth, or pain in your knee. · You have knee pain and a fever or rash. · You have such bad pain that you cannot use your knee. Watch closely for changes in your health, and be sure to contact your doctor if you have any problems. Where can you learn more? Go to [email or web address].[email or web address] org and sign in to your [employer] account. Enter Z417 in the [employer] box to learn more about \"Knee Arthritis: Care Instructions. \"     If you do not have an account, please click on the \"Sign Up Now\" link. Current as of: October 31, 2016  Content Version: 11.3  © 2623-4921 [employer]. Care instructions adapted under license by [facility] ([facility]). If you have questions about a medical condition or this instruction, always ask your healthcare professional. [place] [address] any warranty or liability for your use of this information. Patient Education        Joint Injections: Care Instructions  Your Care Instructions  Joint injections are shots into a joint, such as the knee. They may be used to put in medicines, such as pain relievers. Or they can be used to take out fluid. Sometimes the fluid is tested in a lab. This can help find the cause of a joint problem. A corticosteroid, or steroid, shot is used to reduce inflammation in tendons or joints. It is often used to treat problems such as arthritis, tendinitis, and bursitis. Steroids can be injected directly into a painful, inflamed joint. They can also help reduce inflammation of a bursa. A bursa is a sac of fluid. It cushions and lubricates areas where tendons, ligaments, skin, muscles, or bones rub against each other. A steroid shot can sometimes help with short-term pain relief when other treatments haven't worked. If steroid shots help, pain may improve for weeks or months. Follow-up care is a key part of your treatment and safety.  Be sure to make and go to all appointments, and call your doctor if you are having problems. It's also a good idea to know your test results and keep a list of the medicines you take. How can you care for yourself at home? · Put ice or a cold pack on the area for 10 to 20 minutes at a time. Put a thin cloth between the ice and your skin. · Take anti-inflammatory medicines to reduce pain, swelling, or inflammation. These include ibuprofen (Advil, Motrin) and naproxen (Aleve). Read and follow all instructions on the label. · Avoid strenuous activities for several days, especially those that put stress on the area where you got the shot. · If you have dressings over the area, keep them clean and dry. You may remove them when your doctor tells you to. When should you call for help? Call your doctor now or seek immediate medical care if:  · You have signs of infection, such as:  ¨ Increased pain, swelling, warmth, or redness. ¨ Red streaks leading from the site. ¨ Pus draining from the site. ¨ A fever. Watch closely for changes in your health, and be sure to contact your doctor if you have any problems. Where can you learn more? Go to https://Mutracx.nothingGrinder. org and sign in to your [employer] account. Enter N616 in the [employer] box to learn more about \"Joint Injections: Care Instructions. \"     If you do not have an account, please click on the \"Sign Up Now\" link. Current as of: March 21, 2017  Content Version: 11.3  © 3477-1615 [employer], [employer]. Care instructions adapted under license by [facility] ([facility]). If you have questions about a medical condition or this instruction, always ask your healthcare professional. [address] any warranty or liability for your use of this information. [employer] Pregnancy And Lactation Text: This medication is Pregnancy Category D and is not considered safe during pregnancy.  The risk during breast feeding is also uncertain.

## 2023-12-07 ENCOUNTER — OFFICE VISIT (OUTPATIENT)
Dept: NEUROLOGY | Age: 78
End: 2023-12-07
Payer: MEDICARE

## 2023-12-07 VITALS — WEIGHT: 132.28 LBS | TEMPERATURE: 98 F | HEIGHT: 59 IN | BODY MASS INDEX: 26.67 KG/M2 | RESPIRATION RATE: 22 BRPM

## 2023-12-07 DIAGNOSIS — F51.01 PRIMARY INSOMNIA: ICD-10-CM

## 2023-12-07 DIAGNOSIS — G30.9 DEMENTIA DUE TO ALZHEIMER'S DISEASE (HCC): Primary | ICD-10-CM

## 2023-12-07 DIAGNOSIS — G25.0 ESSENTIAL TREMOR: ICD-10-CM

## 2023-12-07 DIAGNOSIS — F34.1 DYSTHYMIA: ICD-10-CM

## 2023-12-07 DIAGNOSIS — F02.80 DEMENTIA DUE TO ALZHEIMER'S DISEASE (HCC): Primary | ICD-10-CM

## 2023-12-07 PROCEDURE — G8400 PT W/DXA NO RESULTS DOC: HCPCS | Performed by: PSYCHIATRY & NEUROLOGY

## 2023-12-07 PROCEDURE — G8484 FLU IMMUNIZE NO ADMIN: HCPCS | Performed by: PSYCHIATRY & NEUROLOGY

## 2023-12-07 PROCEDURE — 99214 OFFICE O/P EST MOD 30 MIN: CPT | Performed by: PSYCHIATRY & NEUROLOGY

## 2023-12-07 PROCEDURE — G8427 DOCREV CUR MEDS BY ELIG CLIN: HCPCS | Performed by: PSYCHIATRY & NEUROLOGY

## 2023-12-07 PROCEDURE — G8419 CALC BMI OUT NRM PARAM NOF/U: HCPCS | Performed by: PSYCHIATRY & NEUROLOGY

## 2023-12-07 PROCEDURE — 1123F ACP DISCUSS/DSCN MKR DOCD: CPT | Performed by: PSYCHIATRY & NEUROLOGY

## 2023-12-07 PROCEDURE — 1090F PRES/ABSN URINE INCON ASSESS: CPT | Performed by: PSYCHIATRY & NEUROLOGY

## 2023-12-07 PROCEDURE — 1036F TOBACCO NON-USER: CPT | Performed by: PSYCHIATRY & NEUROLOGY

## 2023-12-07 RX ORDER — MEMANTINE HYDROCHLORIDE 14 MG/1
1 CAPSULE, EXTENDED RELEASE ORAL DAILY
Qty: 90 CAPSULE | Refills: 1 | Status: SHIPPED | OUTPATIENT
Start: 2023-12-07

## 2023-12-07 RX ORDER — DONEPEZIL HYDROCHLORIDE 10 MG/1
10 TABLET, FILM COATED ORAL DAILY
Qty: 90 TABLET | Refills: 1 | Status: SHIPPED | OUTPATIENT
Start: 2023-12-07

## 2024-04-02 ENCOUNTER — TELEPHONE (OUTPATIENT)
Dept: NEUROLOGY | Age: 79
End: 2024-04-02

## 2024-04-02 NOTE — TELEPHONE ENCOUNTER
Preethi called the office today to ask about getting a statement for social security saying that the patient has dementia, for Preethi to become a representative for the patient      Please send statement to Preethi's  email: it is  [email or web address]

## 2024-12-11 ENCOUNTER — OFFICE VISIT (OUTPATIENT)
Age: 79
End: 2024-12-11
Payer: MEDICARE

## 2024-12-11 VITALS
BODY MASS INDEX: 26.61 KG/M2 | HEIGHT: 59 IN | WEIGHT: 132 LBS | OXYGEN SATURATION: 93 % | SYSTOLIC BLOOD PRESSURE: 148 MMHG | HEART RATE: 110 BPM | DIASTOLIC BLOOD PRESSURE: 48 MMHG

## 2024-12-11 DIAGNOSIS — G25.3 MYOCLONUS: ICD-10-CM

## 2024-12-11 DIAGNOSIS — F03.C2 SEVERE DEMENTIA WITH PSYCHOTIC DISTURBANCE, UNSPECIFIED DEMENTIA TYPE (HCC): Primary | ICD-10-CM

## 2024-12-11 PROCEDURE — 3078F DIAST BP <80 MM HG: CPT | Performed by: STUDENT IN AN ORGANIZED HEALTH CARE EDUCATION/TRAINING PROGRAM

## 2024-12-11 PROCEDURE — G8419 CALC BMI OUT NRM PARAM NOF/U: HCPCS | Performed by: STUDENT IN AN ORGANIZED HEALTH CARE EDUCATION/TRAINING PROGRAM

## 2024-12-11 PROCEDURE — 99214 OFFICE O/P EST MOD 30 MIN: CPT | Performed by: STUDENT IN AN ORGANIZED HEALTH CARE EDUCATION/TRAINING PROGRAM

## 2024-12-11 PROCEDURE — G8428 CUR MEDS NOT DOCUMENT: HCPCS | Performed by: STUDENT IN AN ORGANIZED HEALTH CARE EDUCATION/TRAINING PROGRAM

## 2024-12-11 PROCEDURE — 1123F ACP DISCUSS/DSCN MKR DOCD: CPT | Performed by: STUDENT IN AN ORGANIZED HEALTH CARE EDUCATION/TRAINING PROGRAM

## 2024-12-11 PROCEDURE — 1036F TOBACCO NON-USER: CPT | Performed by: STUDENT IN AN ORGANIZED HEALTH CARE EDUCATION/TRAINING PROGRAM

## 2024-12-11 PROCEDURE — G8400 PT W/DXA NO RESULTS DOC: HCPCS | Performed by: STUDENT IN AN ORGANIZED HEALTH CARE EDUCATION/TRAINING PROGRAM

## 2024-12-11 PROCEDURE — G8484 FLU IMMUNIZE NO ADMIN: HCPCS | Performed by: STUDENT IN AN ORGANIZED HEALTH CARE EDUCATION/TRAINING PROGRAM

## 2024-12-11 PROCEDURE — 1090F PRES/ABSN URINE INCON ASSESS: CPT | Performed by: STUDENT IN AN ORGANIZED HEALTH CARE EDUCATION/TRAINING PROGRAM

## 2024-12-11 PROCEDURE — 3077F SYST BP >= 140 MM HG: CPT | Performed by: STUDENT IN AN ORGANIZED HEALTH CARE EDUCATION/TRAINING PROGRAM

## 2024-12-11 RX ORDER — GABAPENTIN 300 MG/1
300 CAPSULE ORAL
COMMUNITY
End: 2024-12-11

## 2024-12-11 RX ORDER — OXYBUTYNIN CHLORIDE 5 MG/1
5 TABLET ORAL 2 TIMES DAILY
COMMUNITY
End: 2024-12-11

## 2024-12-11 RX ORDER — LISINOPRIL 10 MG/1
10 TABLET ORAL DAILY
COMMUNITY
Start: 2024-09-23

## 2024-12-11 RX ORDER — MEMANTINE HYDROCHLORIDE 14 MG/1
1 CAPSULE, EXTENDED RELEASE ORAL DAILY
Qty: 90 CAPSULE | Refills: 3 | Status: SHIPPED | OUTPATIENT
Start: 2024-12-11 | End: 2025-12-06

## 2024-12-11 RX ORDER — DONEPEZIL HYDROCHLORIDE 10 MG/1
10 TABLET, FILM COATED ORAL DAILY
Qty: 90 TABLET | Refills: 3 | Status: SHIPPED | OUTPATIENT
Start: 2024-12-11 | End: 2025-12-06

## 2024-12-11 RX ORDER — LOTILANER OPHTHALMIC SOLUTION 2.5 MG/ML
1 SOLUTION/ DROPS OPHTHALMIC
COMMUNITY
Start: 2024-02-06

## 2024-12-11 RX ORDER — ONDANSETRON 4 MG/1
4 TABLET, ORALLY DISINTEGRATING ORAL EVERY 8 HOURS PRN
COMMUNITY
Start: 2024-06-10

## 2024-12-11 RX ORDER — QUETIAPINE FUMARATE 50 MG/1
50 TABLET, EXTENDED RELEASE ORAL NIGHTLY
COMMUNITY

## 2024-12-11 NOTE — PATIENT INSTRUCTIONS
Stop taking oxybutynin (Ditropan). Monitor for worse urinary urgency, excessive urination, incontinence, and improved memory.     Wait two weeks, then stop taking gabapentin. Monitor for worse pain, abnormal leg movements at night, and anxiety, improved jerky movements.     ===    These are very useful online resource for mild cognitive impairment and dementia caused by neurodegenerative disease.     Alzheimer's disease: <https://www.alz.org/>   Dementia with Lewy Bodies: <https://www.lbda.org/>

## 2024-12-11 NOTE — PROGRESS NOTES
Irma Perez (:  1945) is a 79 y.o. female,Established patient, here for evaluation of the following chief complaint(s):  Follow-up (F/U Dementia, former [facility] pt)      Assessment & Plan   1. Severe dementia with psychotic disturbance, unspecified dementia type (HCC)  Assessment & Plan:   Chronic, not at goal (unstable), changes made today: stop oxybutynin (unclear indication), continue donepezil and memantine same dose. Dementia has progressed to severe state over approximately 5 years in setting of family history of parkinsonism and dementia (mom) and memory changes (daughter). She has visual hallucinations, higher level gait disorder, myoclonus (not tremor), apraxia, and prominent aphasia. Most likely dementia is due to Lewy body disease but this could also be dual pathology AD/DLB. Rule out other structural abnormality and prominent vascular disease with brain MRI. Oxybutynin is very likely making her cognitive symptoms worse. Daughter is interested in genetic testing but this would not  for Irma. Advised daughter to see her PCP about genetic testing given family history.  Orders:  -     MRI BRAIN WO CONTRAST; Future  -     memantine ER (NAMENDA XR) 14 MG CP24 extended release capsule; Take 1 capsule by mouth daily, Disp-90 capsule, R-3Normal  -     donepezil (ARICEPT) 10 MG tablet; Take 1 tablet by mouth daily, Disp-90 tablet, R-3Normal  2. Myoclonus  Assessment & Plan:   Chronic, not at goal (unstable), changes made today: wait 2 weeks after stopping oxybutynin then stop gabapentin. Myoclonus is likely due to neurodegenerative disease that could be exacerbated by medications. Unclear indication for gabapentin use anyhow. Consider Keppra in the future if myoclonus remains bothersome.      Return in about 3 months (around 3/11/2025).       Subjective   Follow up for dementia and tremor. Accompanied by her daughter. She has history of visual hallucinations but now it is hard to

## 2024-12-11 NOTE — ASSESSMENT & PLAN NOTE
Chronic, not at goal (unstable), changes made today: stop oxybutynin (unclear indication), continue donepezil and memantine same dose. Dementia has progressed to severe state over approximately 5 years in setting of family history of parkinsonism and dementia (mom) and memory changes (daughter). She has visual hallucinations, higher level gait disorder, myoclonus (not tremor) and now prominent aphasia. Most likely dementia is due to Lewy body disease but this could also be dual pathology AD/DLB. Rule out other structural abnormality and prominent vascular disease with brain MRI. Oxybutynin is very likely making her cognitive symptoms worse. Daughter is interested in genetic testing but this would not  for Irma. Advised daughter to see her PCP about genetic testing given family history.

## 2025-01-21 ENCOUNTER — HOSPITAL ENCOUNTER (OUTPATIENT)
Dept: MRI IMAGING | Age: 80
Discharge: HOME | End: 2025-01-21
Attending: STUDENT IN AN ORGANIZED HEALTH CARE EDUCATION/TRAINING PROGRAM
Payer: MEDICARE

## 2025-01-21 DIAGNOSIS — F03.C2 SEVERE DEMENTIA WITH PSYCHOTIC DISTURBANCE, UNSPECIFIED DEMENTIA TYPE (HCC): ICD-10-CM

## 2025-01-21 PROCEDURE — 70551 MRI BRAIN STEM W/O DYE: CPT

## 2025-03-19 ENCOUNTER — OFFICE VISIT (OUTPATIENT)
Age: 80
End: 2025-03-19
Payer: MEDICARE

## 2025-03-19 VITALS
SYSTOLIC BLOOD PRESSURE: 153 MMHG | BODY MASS INDEX: 22.98 KG/M2 | HEIGHT: 59 IN | DIASTOLIC BLOOD PRESSURE: 62 MMHG | OXYGEN SATURATION: 97 % | HEART RATE: 65 BPM | WEIGHT: 114 LBS

## 2025-03-19 DIAGNOSIS — F03.C2 SEVERE DEMENTIA WITH PSYCHOTIC DISTURBANCE, UNSPECIFIED DEMENTIA TYPE (HCC): Primary | ICD-10-CM

## 2025-03-19 DIAGNOSIS — R26.0 ATAXIC GAIT: ICD-10-CM

## 2025-03-19 DIAGNOSIS — G25.3 MYOCLONUS: ICD-10-CM

## 2025-03-19 PROCEDURE — 1159F MED LIST DOCD IN RCRD: CPT | Performed by: STUDENT IN AN ORGANIZED HEALTH CARE EDUCATION/TRAINING PROGRAM

## 2025-03-19 PROCEDURE — 99214 OFFICE O/P EST MOD 30 MIN: CPT | Performed by: STUDENT IN AN ORGANIZED HEALTH CARE EDUCATION/TRAINING PROGRAM

## 2025-03-19 PROCEDURE — 1090F PRES/ABSN URINE INCON ASSESS: CPT | Performed by: STUDENT IN AN ORGANIZED HEALTH CARE EDUCATION/TRAINING PROGRAM

## 2025-03-19 PROCEDURE — G8420 CALC BMI NORM PARAMETERS: HCPCS | Performed by: STUDENT IN AN ORGANIZED HEALTH CARE EDUCATION/TRAINING PROGRAM

## 2025-03-19 PROCEDURE — 1036F TOBACCO NON-USER: CPT | Performed by: STUDENT IN AN ORGANIZED HEALTH CARE EDUCATION/TRAINING PROGRAM

## 2025-03-19 PROCEDURE — 3078F DIAST BP <80 MM HG: CPT | Performed by: STUDENT IN AN ORGANIZED HEALTH CARE EDUCATION/TRAINING PROGRAM

## 2025-03-19 PROCEDURE — G8400 PT W/DXA NO RESULTS DOC: HCPCS | Performed by: STUDENT IN AN ORGANIZED HEALTH CARE EDUCATION/TRAINING PROGRAM

## 2025-03-19 PROCEDURE — 3077F SYST BP >= 140 MM HG: CPT | Performed by: STUDENT IN AN ORGANIZED HEALTH CARE EDUCATION/TRAINING PROGRAM

## 2025-03-19 PROCEDURE — G8427 DOCREV CUR MEDS BY ELIG CLIN: HCPCS | Performed by: STUDENT IN AN ORGANIZED HEALTH CARE EDUCATION/TRAINING PROGRAM

## 2025-03-19 PROCEDURE — 1123F ACP DISCUSS/DSCN MKR DOCD: CPT | Performed by: STUDENT IN AN ORGANIZED HEALTH CARE EDUCATION/TRAINING PROGRAM

## 2025-03-19 RX ORDER — PRIMIDONE 50 MG/1
50 TABLET ORAL DAILY
COMMUNITY
Start: 2025-02-11 | End: 2025-03-19 | Stop reason: ALTCHOICE

## 2025-03-19 RX ORDER — DONEPEZIL HYDROCHLORIDE 10 MG/1
10 TABLET, FILM COATED ORAL DAILY
Qty: 90 TABLET | Refills: 3 | Status: SHIPPED | OUTPATIENT
Start: 2025-03-19 | End: 2026-03-14

## 2025-03-19 RX ORDER — DORZOLAMIDE HCL 20 MG/ML
SOLUTION/ DROPS OPHTHALMIC
COMMUNITY
Start: 2025-03-15

## 2025-03-19 RX ORDER — MEMANTINE HYDROCHLORIDE 14 MG/1
1 CAPSULE, EXTENDED RELEASE ORAL DAILY
Qty: 90 CAPSULE | Refills: 3 | Status: SHIPPED | OUTPATIENT
Start: 2025-03-19 | End: 2026-03-14

## 2025-03-19 RX ORDER — LEVETIRACETAM 250 MG/1
TABLET ORAL
Qty: 60 TABLET | Refills: 11 | Status: SHIPPED | OUTPATIENT
Start: 2025-03-19 | End: 2026-03-21
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
## 2025-03-20 ENCOUNTER — TELEPHONE (OUTPATIENT)
Dept: NEUROLOGY | Age: 80
End: 2025-03-20

## 2025-03-20 NOTE — TELEPHONE ENCOUNTER
Serena from [facility] called, they received referral but do not have staff to provide services. Recommend referring elsewhere.

## 2025-06-24 ENCOUNTER — TELEPHONE (OUTPATIENT)
Dept: NEUROLOGY | Age: 80
End: 2025-06-24
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
## 2025-06-24 NOTE — TELEPHONE ENCOUNTER
I spoke with Preethi and told her that Dr. Valera would need to see her if meds need to be adjusted.  She will keep appt.
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
## 2025-06-25 ENCOUNTER — OFFICE VISIT (OUTPATIENT)
Dept: NEUROLOGY | Age: 80
End: 2025-06-25
Payer: MEDICARE

## 2025-06-25 VITALS
HEART RATE: 62 BPM | SYSTOLIC BLOOD PRESSURE: 126 MMHG | HEIGHT: 59 IN | OXYGEN SATURATION: 96 % | WEIGHT: 125 LBS | DIASTOLIC BLOOD PRESSURE: 62 MMHG | BODY MASS INDEX: 25.2 KG/M2

## 2025-06-25 DIAGNOSIS — F03.C2 SEVERE DEMENTIA WITH PSYCHOTIC DISTURBANCE, UNSPECIFIED DEMENTIA TYPE (HCC): Primary | ICD-10-CM

## 2025-06-25 DIAGNOSIS — G25.3 MYOCLONUS: ICD-10-CM

## 2025-06-25 DIAGNOSIS — G47.00 INSOMNIA, UNSPECIFIED TYPE: ICD-10-CM

## 2025-06-25 DIAGNOSIS — R44.1 VISUAL HALLUCINATIONS: ICD-10-CM

## 2025-06-25 DIAGNOSIS — R26.0 ATAXIC GAIT: ICD-10-CM

## 2025-06-25 PROCEDURE — G8400 PT W/DXA NO RESULTS DOC: HCPCS | Performed by: STUDENT IN AN ORGANIZED HEALTH CARE EDUCATION/TRAINING PROGRAM

## 2025-06-25 PROCEDURE — 1123F ACP DISCUSS/DSCN MKR DOCD: CPT | Performed by: STUDENT IN AN ORGANIZED HEALTH CARE EDUCATION/TRAINING PROGRAM

## 2025-06-25 PROCEDURE — 99214 OFFICE O/P EST MOD 30 MIN: CPT | Performed by: STUDENT IN AN ORGANIZED HEALTH CARE EDUCATION/TRAINING PROGRAM

## 2025-06-25 PROCEDURE — 1159F MED LIST DOCD IN RCRD: CPT | Performed by: STUDENT IN AN ORGANIZED HEALTH CARE EDUCATION/TRAINING PROGRAM

## 2025-06-25 PROCEDURE — 1036F TOBACCO NON-USER: CPT | Performed by: STUDENT IN AN ORGANIZED HEALTH CARE EDUCATION/TRAINING PROGRAM

## 2025-06-25 PROCEDURE — G8419 CALC BMI OUT NRM PARAM NOF/U: HCPCS | Performed by: STUDENT IN AN ORGANIZED HEALTH CARE EDUCATION/TRAINING PROGRAM

## 2025-06-25 PROCEDURE — 1090F PRES/ABSN URINE INCON ASSESS: CPT | Performed by: STUDENT IN AN ORGANIZED HEALTH CARE EDUCATION/TRAINING PROGRAM

## 2025-06-25 PROCEDURE — 3074F SYST BP LT 130 MM HG: CPT | Performed by: STUDENT IN AN ORGANIZED HEALTH CARE EDUCATION/TRAINING PROGRAM

## 2025-06-25 PROCEDURE — G8427 DOCREV CUR MEDS BY ELIG CLIN: HCPCS | Performed by: STUDENT IN AN ORGANIZED HEALTH CARE EDUCATION/TRAINING PROGRAM

## 2025-06-25 PROCEDURE — 3078F DIAST BP <80 MM HG: CPT | Performed by: STUDENT IN AN ORGANIZED HEALTH CARE EDUCATION/TRAINING PROGRAM

## 2025-06-25 RX ORDER — SENNOSIDES 8.6 MG/1
1 TABLET ORAL 2 TIMES DAILY
COMMUNITY
Start: 2025-05-20

## 2025-06-25 RX ORDER — MEMANTINE HYDROCHLORIDE 14 MG/1
1 CAPSULE, EXTENDED RELEASE ORAL DAILY
Qty: 90 CAPSULE | Refills: 3 | Status: SHIPPED | OUTPATIENT
Start: 2025-06-25 | End: 2026-06-20

## 2025-06-25 RX ORDER — OMEPRAZOLE 20 MG/1
20 CAPSULE, DELAYED RELEASE ORAL DAILY
COMMUNITY

## 2025-06-25 RX ORDER — LEVETIRACETAM 250 MG/1
250 TABLET ORAL
Qty: 90 TABLET | Refills: 3 | Status: SHIPPED | OUTPATIENT
Start: 2025-06-25 | End: 2026-06-20

## 2025-06-25 RX ORDER — ERGOCALCIFEROL 1.25 MG/1
50000 CAPSULE, LIQUID FILLED ORAL WEEKLY
COMMUNITY
Start: 2025-05-25

## 2025-06-25 RX ORDER — DONEPEZIL HYDROCHLORIDE 10 MG/1
10 TABLET, FILM COATED ORAL DAILY
Qty: 90 TABLET | Refills: 3 | Status: SHIPPED | OUTPATIENT
Start: 2025-06-25 | End: 2026-06-20

## 2025-06-25 RX ORDER — ACETAMINOPHEN 325 MG/1
650 TABLET ORAL EVERY 6 HOURS PRN
COMMUNITY

## 2025-06-25 RX ORDER — QUETIAPINE FUMARATE 25 MG/1
TABLET, FILM COATED ORAL
Qty: 168 TABLET | Refills: 0 | Status: SHIPPED | OUTPATIENT
Start: 2025-06-25 | End: 2025-09-17
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
## (undated) DEVICE — PIN FIX L170MM DIA4MM BNE SELF DRL

## (undated) DEVICE — SOLUTION IRRIG 2000ML 0.9% SOD CHL USP UROMATIC PLAS CONT

## (undated) DEVICE — CORD RETRCT SIL

## (undated) DEVICE — BOOT POS LEG DEMAYO

## (undated) DEVICE — PEEL-AWAY TOGA, 2X LARGE: Brand: FLYTE

## (undated) DEVICE — CUTTER SURG OD42MM PAT KNEE DISP FOR RM SYS XCELERATE

## (undated) DEVICE — BLADE SURG SAW S STL NAR OSC W/ SERR EDGE DISP

## (undated) DEVICE — GLOVE ORANGE PI 8 1/2   MSG9085

## (undated) DEVICE — STERILE PVP: Brand: MEDLINE INDUSTRIES, INC.

## (undated) DEVICE — KIT INT FIX FEM TIB CKPT MAKOPLASTY

## (undated) DEVICE — DRESSING FOAM W4XL10IN SIL RECT ADH WTRPRF FLM BK W/ BORD

## (undated) DEVICE — Device

## (undated) DEVICE — SURGICAL PROCEDURE PACK RESTORIS MCK CAPMAKOPFJ] MAKO]

## (undated) DEVICE — BLADE SURG SAW STD S STL OSC W/ SERR EDGE DISP

## (undated) DEVICE — PIN BONE FIX L110MM DIA3.2MM

## (undated) DEVICE — KIT TRK KNEE PROC VIZADISC

## (undated) DEVICE — SYSTEM SKIN CLSR 22CM DERMBND PRINEO

## (undated) DEVICE — SYRINGE, LUER LOCK, 30ML: Brand: MEDLINE

## (undated) DEVICE — GLOVE SURG SZ 65 THK91MIL LTX FREE SYN POLYISOPRENE

## (undated) DEVICE — PIN FIX L140MM DIA4MM BNE

## (undated) DEVICE — HANDPIECE SET WITH HIGH FLOW TIP AND SUCTION TUBE: Brand: INTERPULSE

## (undated) DEVICE — GLOVE ORANGE PI 8   MSG9080

## (undated) DEVICE — PENCIL SMK EVAC ALL IN 1 DSGN ENH VISIBILITY IMPROVED AIR

## (undated) DEVICE — HOOD, PEEL-AWAY: Brand: FLYTE

## (undated) DEVICE — SUTURE MCRYL + SZ 4-0 L18IN ABSRB UD L19MM PS-2 3/8 CIR MCP496G

## (undated) DEVICE — SOLUTION IV IRRIG POUR BRL 0.9% SODIUM CHL 2F7124

## (undated) DEVICE — KIT DRP FOR RIO ROBOTIC ARM ASST SYS

## (undated) DEVICE — 35 ML SYRINGE LUER-LOCK TIP: Brand: MONOJECT

## (undated) DEVICE — PIN BNE FIX L140MM DIA3.2MM SELF DRL

## (undated) DEVICE — 19 IN KNEE IMMOBILIZER: Brand: DEROYAL